# Patient Record
Sex: FEMALE | Race: WHITE | NOT HISPANIC OR LATINO | Employment: FULL TIME | URBAN - METROPOLITAN AREA
[De-identification: names, ages, dates, MRNs, and addresses within clinical notes are randomized per-mention and may not be internally consistent; named-entity substitution may affect disease eponyms.]

---

## 2023-02-23 PROBLEM — R92.8 ABNORMAL MAMMOGRAM: Status: ACTIVE | Noted: 2023-02-23

## 2023-02-23 PROBLEM — R51.9 HEADACHE: Status: ACTIVE | Noted: 2023-02-23

## 2023-02-23 PROBLEM — D72.819 LEUKOPENIA: Status: ACTIVE | Noted: 2023-02-23

## 2023-02-23 PROBLEM — J31.0 CHRONIC RHINITIS: Status: ACTIVE | Noted: 2023-02-23

## 2023-02-23 PROBLEM — M54.50 LOW BACK PAIN: Status: ACTIVE | Noted: 2023-02-23

## 2023-02-23 PROBLEM — E03.9 HYPOTHYROIDISM: Status: ACTIVE | Noted: 2023-02-23

## 2023-02-23 PROBLEM — G43.109 MIGRAINE WITH AURA AND WITHOUT STATUS MIGRAINOSUS, NOT INTRACTABLE: Status: ACTIVE | Noted: 2023-02-23

## 2023-02-23 PROBLEM — R53.83 FATIGUE: Status: ACTIVE | Noted: 2023-02-23

## 2023-02-23 PROBLEM — J34.89 NASAL OBSTRUCTION: Status: ACTIVE | Noted: 2023-02-23

## 2023-02-23 PROBLEM — F34.1 DYSTHYMIC DISORDER: Status: ACTIVE | Noted: 2023-02-23

## 2023-02-23 PROBLEM — D72.810 ACQUIRED LYMPHOPENIA: Status: ACTIVE | Noted: 2023-02-23

## 2023-02-23 PROBLEM — R90.89 ABNORMAL FINDING ON MRI OF BRAIN: Status: ACTIVE | Noted: 2023-02-23

## 2023-02-23 PROBLEM — E55.9 VITAMIN D DEFICIENCY: Status: ACTIVE | Noted: 2023-02-23

## 2023-02-23 PROBLEM — J34.3 HYPERTROPHY OF NASAL TURBINATES: Status: ACTIVE | Noted: 2023-02-23

## 2023-02-23 PROBLEM — T75.3XXA MOTION SICKNESS: Status: ACTIVE | Noted: 2023-02-23

## 2023-02-23 PROBLEM — L23.7 POISON IVY: Status: ACTIVE | Noted: 2023-02-23

## 2023-02-23 PROBLEM — F40.243 FEAR OF FLYING: Status: ACTIVE | Noted: 2023-02-23

## 2023-02-23 PROBLEM — K14.8 TONGUE DEVIATION: Status: ACTIVE | Noted: 2023-02-23

## 2023-02-23 PROBLEM — F41.8 DEPRESSION WITH ANXIETY: Status: ACTIVE | Noted: 2023-02-23

## 2023-02-23 PROBLEM — R09.81 NASAL CONGESTION: Status: ACTIVE | Noted: 2023-02-23

## 2023-02-23 PROBLEM — R09.82 POSTNASAL DRIP: Status: ACTIVE | Noted: 2023-02-23

## 2023-02-23 RX ORDER — MULTIVITAMIN
1 TABLET ORAL DAILY
COMMUNITY
End: 2023-03-23 | Stop reason: ALTCHOICE

## 2023-02-23 RX ORDER — SUMATRIPTAN SUCCINATE 25 MG/1
1 TABLET ORAL
COMMUNITY

## 2023-02-23 RX ORDER — MECLIZINE HYDROCHLORIDE 25 MG/1
25 TABLET ORAL 3 TIMES DAILY PRN
COMMUNITY
End: 2024-01-18 | Stop reason: ALTCHOICE

## 2023-02-23 RX ORDER — DICLOFENAC SODIUM 100 MG/1
100 TABLET, EXTENDED RELEASE ORAL DAILY
COMMUNITY
End: 2024-01-18 | Stop reason: ALTCHOICE

## 2023-02-23 RX ORDER — LEVOTHYROXINE SODIUM 50 UG/1
1 TABLET ORAL DAILY
COMMUNITY
End: 2023-06-22 | Stop reason: SDUPTHER

## 2023-02-23 RX ORDER — NORETHINDRONE ACETATE AND ETHINYL ESTRADIOL, ETHINYL ESTRADIOL AND FERROUS FUMARATE 1MG-10(24)
1 KIT ORAL
COMMUNITY

## 2023-02-23 RX ORDER — SERTRALINE HYDROCHLORIDE 100 MG/1
2 TABLET, FILM COATED ORAL DAILY
COMMUNITY
End: 2023-03-17

## 2023-03-17 DIAGNOSIS — F41.8 OTHER SPECIFIED ANXIETY DISORDERS: ICD-10-CM

## 2023-03-17 RX ORDER — SERTRALINE HYDROCHLORIDE 100 MG/1
TABLET, FILM COATED ORAL
Qty: 90 TABLET | Refills: 1 | Status: SHIPPED | OUTPATIENT
Start: 2023-03-17 | End: 2023-03-23

## 2023-03-23 ENCOUNTER — OFFICE VISIT (OUTPATIENT)
Dept: PRIMARY CARE | Facility: CLINIC | Age: 43
End: 2023-03-23
Payer: COMMERCIAL

## 2023-03-23 VITALS
WEIGHT: 153 LBS | TEMPERATURE: 96.8 F | BODY MASS INDEX: 24.59 KG/M2 | HEIGHT: 66 IN | DIASTOLIC BLOOD PRESSURE: 80 MMHG | SYSTOLIC BLOOD PRESSURE: 122 MMHG

## 2023-03-23 DIAGNOSIS — R53.83 OTHER FATIGUE: Primary | ICD-10-CM

## 2023-03-23 DIAGNOSIS — D72.810 ACQUIRED LYMPHOPENIA: ICD-10-CM

## 2023-03-23 DIAGNOSIS — F41.8 DEPRESSION WITH ANXIETY: ICD-10-CM

## 2023-03-23 PROCEDURE — 99214 OFFICE O/P EST MOD 30 MIN: CPT | Performed by: FAMILY MEDICINE

## 2023-03-23 PROCEDURE — 1036F TOBACCO NON-USER: CPT | Performed by: FAMILY MEDICINE

## 2023-03-23 ASSESSMENT — ENCOUNTER SYMPTOMS
DYSURIA: 0
ABDOMINAL PAIN: 0
DIAPHORESIS: 0
BLOOD IN STOOL: 0
DIARRHEA: 0
DIZZINESS: 0
DEPRESSION: 0
NAUSEA: 0
HEMATURIA: 0
COUGH: 0
FATIGUE: 1
FEVER: 0
VOMITING: 0
LIGHT-HEADEDNESS: 0
CHILLS: 1
SHORTNESS OF BREATH: 0
PALPITATIONS: 1
CONSTIPATION: 0
HEADACHES: 1

## 2023-03-23 NOTE — PATIENT INSTRUCTIONS
I recommend calling for the sleep study and the hematology referral.  I will try a different medication for depression and anxiety.  Stop sertraline.  Instead, we will try trintellix.  Return in one month for a recheck and sooner if anything is worse.

## 2023-03-23 NOTE — PROGRESS NOTES
"Subjective   Patient ID: [id number]     Ewa Chauhan is a 43 y.o. female who presents for Follow-up and Med Refill.  HPI  She is here for a recheck and medication refills.    Her sertraline was increased at her visit last month.  She was referred for a sleep study and for a hematology referral for persistent lymphopenia in the setting of fatigue.      She has a neurologist and has had a  CT and MRI of the brain.      She had covid the last week of December.    She has ongoing anxiety.  This is not better since increasing the sertraline.      She has not yet called for hematology or sleep study appointments.  She has still ongoing fatigue.  She has had fatigue since age 15 and it has worsened over the last five years.      Review of Systems   Constitutional:  Positive for chills and fatigue. Negative for diaphoresis and fever.        Has had chills since her first bout of covid in 2021.   Respiratory:  Negative for cough and shortness of breath.    Cardiovascular:  Positive for palpitations. Negative for chest pain.   Gastrointestinal:  Negative for abdominal pain, blood in stool, constipation, diarrhea, nausea and vomiting.   Genitourinary:  Negative for dysuria and hematuria.   Neurological:  Positive for headaches (migraine medication helps.). Negative for dizziness and light-headedness.         Objective         /80 (BP Location: Right arm, Patient Position: Sitting)   Temp 36 °C (96.8 °F) (Skin)   Ht 1.676 m (5' 6\")   Wt 69.4 kg (153 lb)   BMI 24.69 kg/m²      Physical Exam  Constitutional:       Appearance: Normal appearance.   Cardiovascular:      Rate and Rhythm: Normal rate and regular rhythm.      Heart sounds: Normal heart sounds.   Pulmonary:      Effort: Pulmonary effort is normal.      Breath sounds: Normal breath sounds.   Abdominal:      General: There is no distension.      Palpations: Abdomen is soft. There is no mass.      Tenderness: There is no abdominal tenderness. There is no " guarding or rebound.   Neurological:      Mental Status: She is alert.   Psychiatric:         Behavior: Behavior normal.      Comments: Anxious affect.         Assessment/Plan   Problem List Items Addressed This Visit          Hematologic    Acquired lymphopenia       Other    Depression with anxiety    Relevant Medications    vortioxetine (Trintellix) 5 mg tablet tablet    Fatigue - Primary     I recommend calling for the sleep study and the hematology referral.  I will try a different medication for depression and anxiety.  Stop sertraline.  Instead, we will try trintellix.  Return in one month for a recheck and sooner if anything is worse.    Se Law, DO

## 2023-04-10 DIAGNOSIS — F41.8 DEPRESSION WITH ANXIETY: Primary | ICD-10-CM

## 2023-04-10 RX ORDER — ESCITALOPRAM OXALATE 10 MG/1
10 TABLET ORAL DAILY
Qty: 30 TABLET | Refills: 1 | Status: SHIPPED | OUTPATIENT
Start: 2023-04-10 | End: 2023-06-12

## 2023-04-10 NOTE — PROGRESS NOTES
Insurance won't cover Trintellix until two alternatives are attempted.  She tried sertraline.  I called in escitalopram.

## 2023-04-20 ENCOUNTER — APPOINTMENT (OUTPATIENT)
Dept: PRIMARY CARE | Facility: CLINIC | Age: 43
End: 2023-04-20
Payer: COMMERCIAL

## 2023-05-19 ENCOUNTER — OFFICE VISIT (OUTPATIENT)
Dept: PRIMARY CARE | Facility: CLINIC | Age: 43
End: 2023-05-19
Payer: COMMERCIAL

## 2023-05-19 ENCOUNTER — LAB (OUTPATIENT)
Dept: LAB | Facility: LAB | Age: 43
End: 2023-05-19
Payer: COMMERCIAL

## 2023-05-19 VITALS
WEIGHT: 142 LBS | SYSTOLIC BLOOD PRESSURE: 120 MMHG | DIASTOLIC BLOOD PRESSURE: 80 MMHG | HEART RATE: 67 BPM | OXYGEN SATURATION: 99 % | BODY MASS INDEX: 22.29 KG/M2 | RESPIRATION RATE: 19 BRPM | TEMPERATURE: 97.7 F | HEIGHT: 67 IN

## 2023-05-19 DIAGNOSIS — D72.89: ICD-10-CM

## 2023-05-19 DIAGNOSIS — F41.8 DEPRESSION WITH ANXIETY: ICD-10-CM

## 2023-05-19 DIAGNOSIS — R53.82 CHRONIC FATIGUE: Primary | ICD-10-CM

## 2023-05-19 DIAGNOSIS — R53.82 CHRONIC FATIGUE: ICD-10-CM

## 2023-05-19 PROCEDURE — 82670 ASSAY OF TOTAL ESTRADIOL: CPT

## 2023-05-19 PROCEDURE — 82677 ASSAY OF ESTRIOL: CPT

## 2023-05-19 PROCEDURE — 84144 ASSAY OF PROGESTERONE: CPT

## 2023-05-19 PROCEDURE — 84402 ASSAY OF FREE TESTOSTERONE: CPT

## 2023-05-19 PROCEDURE — 36415 COLL VENOUS BLD VENIPUNCTURE: CPT

## 2023-05-19 PROCEDURE — 99213 OFFICE O/P EST LOW 20 MIN: CPT | Performed by: FAMILY MEDICINE

## 2023-05-19 PROCEDURE — 84403 ASSAY OF TOTAL TESTOSTERONE: CPT

## 2023-05-19 PROCEDURE — 82679 ASSAY OF ESTRONE: CPT

## 2023-05-19 PROCEDURE — 1036F TOBACCO NON-USER: CPT | Performed by: FAMILY MEDICINE

## 2023-05-19 ASSESSMENT — PATIENT HEALTH QUESTIONNAIRE - PHQ9
2. FEELING DOWN, DEPRESSED OR HOPELESS: NOT AT ALL
SUM OF ALL RESPONSES TO PHQ9 QUESTIONS 1 AND 2: 0
1. LITTLE INTEREST OR PLEASURE IN DOING THINGS: NOT AT ALL

## 2023-05-19 ASSESSMENT — PAIN SCALES - GENERAL: PAINLEVEL: 0-NO PAIN

## 2023-05-19 NOTE — PATIENT INSTRUCTIONS
I will prescribe Trintellix.  Return in one month for a recheck.  Hormone levels were rechecked.  You just had a sleep study and the results should be in soon.  If this evaluation fails to find anything and the Trintellix does not help, then a  neurology evaluation may be the next step.

## 2023-05-19 NOTE — PROGRESS NOTES
"Subjective   Patient ID: [id number]     Ewa Chauhan is a 43 y.o. female who presents for Follow-up.  HPI  She is here for a recheck.  She has been followed for unexplained fatigue.  I tried to prescribe Trintellix.  It was not covered.    Instead, she was tried on escitalopram.  It has not helped.  She remains fatigued.  She had a rough week.  Very fatigued. No energy.      She has trouble forming words. Very tired.      No numbness or tingling or weakness of the arms or legs.      Has had headaches for years.  Imitrex helps.    She had a home sleep study last night.  Results are pending.    She had flow cytometry--normal.    Prior to high dose Lexapro, she was on sertraline 100 mg bid.  That was not helpful.  Objective     /80 (BP Location: Right arm, Patient Position: Sitting, BP Cuff Size: Adult)   Pulse 67   Temp 36.5 °C (97.7 °F) (Temporal)   Resp 19   Ht 1.702 m (5' 7\")   Wt 64.4 kg (142 lb)   SpO2 99%   BMI 22.24 kg/m²      Physical Exam  Constitutional:       Appearance: Normal appearance.   Cardiovascular:      Rate and Rhythm: Normal rate and regular rhythm.      Heart sounds: Normal heart sounds.   Pulmonary:      Effort: Pulmonary effort is normal.      Breath sounds: Normal breath sounds.   Neurological:      General: No focal deficit present.      Mental Status: She is alert and oriented to person, place, and time.   Psychiatric:      Comments: Depressed affect.  No SI.           Assessment/Plan   Problem List Items Addressed This Visit          Hematologic    Lymphocyte depletion       Other    Depression with anxiety    Relevant Medications    vortioxetine (Trintellix) 5 mg tablet tablet    Other Relevant Orders    Estradiol    Estrone    Estriol    Progesterone    Testosterone, total and free    Fatigue - Primary    Relevant Orders    Estradiol    Estrone    Estriol    Progesterone    Testosterone, total and free   I will prescribe Trintellix.  Return in one month for a recheck.  " Hormone levels were rechecked.  You just had a sleep study and the results should be in soon.  If this evaluation fails to find anything and the Trintellix does not help, then a  neurology evaluation may be the next step.      Se Law, DO

## 2023-05-20 LAB
ESTRADIOL (PG/ML) IN SER/PLAS: <19 PG/ML
PROGESTERONE (NG/ML) IN SER/PLAS: <0.3 NG/ML

## 2023-05-23 LAB — ESTRIOL: <0.1 NG/ML

## 2023-05-27 LAB
TESTOSTERONE FREE (CHAN): 0.8 PG/ML (ref 0.1–6.4)
TESTOSTERONE,TOTAL,LC-MS/MS: 12 NG/DL (ref 2–45)

## 2023-05-30 ENCOUNTER — TELEPHONE (OUTPATIENT)
Dept: PRIMARY CARE | Facility: CLINIC | Age: 43
End: 2023-05-30
Payer: COMMERCIAL

## 2023-05-30 LAB — ESTRONE: 26.7 PG/ML

## 2023-05-30 NOTE — TELEPHONE ENCOUNTER
----- Message from Se Law DO sent at 5/30/2023  9:40 AM EDT -----  Please let her know her labs were not conclusive but they suggested a menopausal state.  She should try the Trintellix and return in one month.

## 2023-06-11 DIAGNOSIS — F41.8 DEPRESSION WITH ANXIETY: ICD-10-CM

## 2023-06-12 RX ORDER — ESCITALOPRAM OXALATE 10 MG/1
TABLET ORAL
Qty: 30 TABLET | Refills: 1 | Status: SHIPPED | OUTPATIENT
Start: 2023-06-12 | End: 2023-06-22

## 2023-06-22 ENCOUNTER — OFFICE VISIT (OUTPATIENT)
Dept: PRIMARY CARE | Facility: CLINIC | Age: 43
End: 2023-06-22
Payer: COMMERCIAL

## 2023-06-22 VITALS
DIASTOLIC BLOOD PRESSURE: 76 MMHG | SYSTOLIC BLOOD PRESSURE: 122 MMHG | TEMPERATURE: 98.3 F | WEIGHT: 147 LBS | BODY MASS INDEX: 23.07 KG/M2 | HEIGHT: 67 IN

## 2023-06-22 DIAGNOSIS — E03.9 ACQUIRED HYPOTHYROIDISM: ICD-10-CM

## 2023-06-22 DIAGNOSIS — F41.8 DEPRESSION WITH ANXIETY: ICD-10-CM

## 2023-06-22 DIAGNOSIS — R53.82 CHRONIC FATIGUE: ICD-10-CM

## 2023-06-22 DIAGNOSIS — R53.82 CHRONIC FATIGUE: Primary | ICD-10-CM

## 2023-06-22 PROCEDURE — 99213 OFFICE O/P EST LOW 20 MIN: CPT | Performed by: FAMILY MEDICINE

## 2023-06-22 PROCEDURE — 1036F TOBACCO NON-USER: CPT | Performed by: FAMILY MEDICINE

## 2023-06-22 RX ORDER — DESOXIMETASONE 2.5 MG/G
CREAM TOPICAL 2 TIMES DAILY
Qty: 30 G | Refills: 1 | Status: SHIPPED | OUTPATIENT
Start: 2023-06-22 | End: 2023-06-22 | Stop reason: SDUPTHER

## 2023-06-22 RX ORDER — DESOXIMETASONE 2.5 MG/G
CREAM TOPICAL 2 TIMES DAILY
Qty: 30 G | Refills: 1 | Status: SHIPPED | OUTPATIENT
Start: 2023-06-22 | End: 2023-06-22

## 2023-06-22 RX ORDER — LEVOTHYROXINE SODIUM 50 UG/1
50 TABLET ORAL DAILY
Qty: 90 TABLET | Refills: 1 | Status: SHIPPED | OUTPATIENT
Start: 2023-06-22 | End: 2023-10-19 | Stop reason: ALTCHOICE

## 2023-06-22 RX ORDER — DESOXIMETASONE 2.5 MG/G
CREAM TOPICAL 2 TIMES DAILY
Qty: 30 G | Refills: 1 | Status: SHIPPED | OUTPATIENT
Start: 2023-06-22 | End: 2024-01-18 | Stop reason: WASHOUT

## 2023-06-22 NOTE — PATIENT INSTRUCTIONS
I will increase the Trintellix to 10 mg daily.  I will refill your levothyroxine.  I recommend a neurology consult.  You will call a neurologist you have seen before, Dr Selby.  Return in one month for a recheck.

## 2023-06-22 NOTE — PROGRESS NOTES
"Subjective   Patient ID: [id number]     Ewa Chauhan is a 43 y.o. female who presents for 1 month follow up.  HPI  She is here for a one month recheck.  She was prescribed Trintellix.      She is on the trintellix.  Feels better but she is still tired.  She has been on it for three and a half weeks.      Mood is better. The tiredness is still there and \"it muffles everything\".  She would not say she feels 50% better but she does feel some improvement.    She denies any side effects to trintellix.    I looked up her sleep study on the old EMR. Normal home sleep study.    Objective     /76   Temp 36.8 °C (98.3 °F)   Ht 1.702 m (5' 7\")   Wt 66.7 kg (147 lb)   BMI 23.02 kg/m²      Physical Exam  Constitutional:       Appearance: Normal appearance.   Cardiovascular:      Rate and Rhythm: Normal rate and regular rhythm.      Heart sounds: Normal heart sounds.   Pulmonary:      Effort: Pulmonary effort is normal. No respiratory distress.      Breath sounds: Normal breath sounds.   Neurological:      General: No focal deficit present.      Mental Status: She is alert and oriented to person, place, and time.         Assessment/Plan   Problem List Items Addressed This Visit          Endocrine/Metabolic    Hypothyroidism    Relevant Medications    levothyroxine (Synthroid, Levoxyl) 50 mcg tablet       Other    Depression with anxiety    Relevant Medications    vortioxetine (Trintellix) 10 mg tablet tablet    desoximetasone (Topicort) 0.25 % cream    Fatigue - Primary    Relevant Medications    desoximetasone (Topicort) 0.25 % cream     I will increase the Trintellix to 10 mg daily.  I will refill your levothyroxine.  I recommend a neurology consult.  You will call a neurologist you have seen before, Dr Selby.  Return in one month for a recheck.    Se Law, DO   "

## 2023-07-20 ENCOUNTER — OFFICE VISIT (OUTPATIENT)
Dept: PRIMARY CARE | Facility: CLINIC | Age: 43
End: 2023-07-20
Payer: COMMERCIAL

## 2023-07-20 VITALS
BODY MASS INDEX: 24.43 KG/M2 | SYSTOLIC BLOOD PRESSURE: 118 MMHG | TEMPERATURE: 98.6 F | DIASTOLIC BLOOD PRESSURE: 80 MMHG | WEIGHT: 156 LBS

## 2023-07-20 DIAGNOSIS — E03.9 HYPOTHYROIDISM, UNSPECIFIED TYPE: ICD-10-CM

## 2023-07-20 DIAGNOSIS — R53.82 CHRONIC FATIGUE: Primary | ICD-10-CM

## 2023-07-20 DIAGNOSIS — F41.8 DEPRESSION WITH ANXIETY: ICD-10-CM

## 2023-07-20 LAB
EBV INTERPRETATION: ABNORMAL
EPSTEIN-BARR VCA IGG: POSITIVE
EPSTEIN-BARR VCA IGM: NEGATIVE
EPSTEIN-BARR VIRUS EARLY ANTIGEN ANTIBODY, IGG: NEGATIVE
EPSTIEN-BARR NUCLEAR ANTIGEN AB: POSITIVE

## 2023-07-20 PROCEDURE — 86663 EPSTEIN-BARR ANTIBODY: CPT

## 2023-07-20 PROCEDURE — 1036F TOBACCO NON-USER: CPT | Performed by: FAMILY MEDICINE

## 2023-07-20 PROCEDURE — 86665 EPSTEIN-BARR CAPSID VCA: CPT

## 2023-07-20 PROCEDURE — 99213 OFFICE O/P EST LOW 20 MIN: CPT | Performed by: FAMILY MEDICINE

## 2023-07-20 PROCEDURE — 86664 EPSTEIN-BARR NUCLEAR ANTIGEN: CPT

## 2023-07-20 NOTE — PROGRESS NOTES
"Subjective   Patient ID: [id number]     Ewa Chauhan is a 43 y.o. female who presents for Follow-up (1 month).  HPI  She is here for a one month recheck.  She has depression and ongoing fatigue.  I increased her trintellix last month and recommended that she see her neurologist.  She was to see Dr Selby.  She has not scheduled with him.  Gyn referred her to endocrinology.      She states she feels about the same.  She is still tired.  She is going to bed at 8:30.      She had a normal sleep study.      She is being referred to an endocrinologist by her gynecologist.  She is seeing her in a couple of months.      She has depression and anxiety but it is not as bad as the fatigue in her mind. No SI.  \"I just want to take a nap.\"    She had covid twice.  The last time was around Christmas 2022.    Objective     /80 (BP Location: Left arm, Patient Position: Sitting)   Temp 37 °C (98.6 °F)   Wt 70.8 kg (156 lb)   BMI 24.43 kg/m²      Physical Exam  Constitutional:       Appearance: Normal appearance.   Cardiovascular:      Rate and Rhythm: Normal rate and regular rhythm.      Heart sounds: Normal heart sounds.   Pulmonary:      Effort: Pulmonary effort is normal.      Breath sounds: Normal breath sounds.   Neurological:      General: No focal deficit present.      Mental Status: She is alert and oriented to person, place, and time.   Psychiatric:         Mood and Affect: Mood normal.         Assessment/Plan   Problem List Items Addressed This Visit          Endocrine/Metabolic    Hypothyroidism       Mental Health    Depression with anxiety       Symptoms and Signs    Fatigue - Primary    Relevant Orders    Linda-Barr virus VCA antibody panel   Continue the Trintellix.  Follow up with endocrinology.  I will order an Linda Barr Virus panel to test for mono.  I recommend that you call Dr Selby for an appointment.  Return in three months.      Se Law, DO   "

## 2023-07-20 NOTE — PATIENT INSTRUCTIONS
Continue the Trintellix.  Follow up with endocrinology.  I will order an Linda Barr Virus panel to test for mono.  I recommend that you call Dr Selby for an appointment.  Return in three months.

## 2023-07-21 ENCOUNTER — TELEPHONE (OUTPATIENT)
Dept: PRIMARY CARE | Facility: CLINIC | Age: 43
End: 2023-07-21
Payer: COMMERCIAL

## 2023-07-21 NOTE — TELEPHONE ENCOUNTER
----- Message from Se Law DO sent at 7/21/2023 12:24 PM EDT -----  Please let her know her mono test showed that she has had mono at some point in her life.  It showed that this is probably not a recent infection and therefore, probably not a cause of her symptoms.

## 2023-07-21 NOTE — TELEPHONE ENCOUNTER
Nursing Transfer Note      11/10/2019     Transfer From: Observation    Transfer via bed    Transfer with cardiac monitoring    Transported by nurse    Medicines sent: No    Chart send with patient: Yes    Notified: Wife at bedside    Patient reassessed at: 11/10/2019, 1040    Upon arrival to floor: cardiac monitor applied, patient oriented to room, call bell in reach and bed in lowest position   Discussed with patient

## 2023-09-22 DIAGNOSIS — F41.8 DEPRESSION WITH ANXIETY: ICD-10-CM

## 2023-09-22 RX ORDER — VORTIOXETINE 10 MG/1
10 TABLET, FILM COATED ORAL DAILY
Qty: 30 TABLET | Refills: 2 | Status: SHIPPED | OUTPATIENT
Start: 2023-09-22 | End: 2023-10-19

## 2023-10-12 ENCOUNTER — LAB (OUTPATIENT)
Dept: LAB | Facility: LAB | Age: 43
End: 2023-10-12
Payer: COMMERCIAL

## 2023-10-12 DIAGNOSIS — R53.83 OTHER FATIGUE: Primary | ICD-10-CM

## 2023-10-12 LAB
25(OH)D3 SERPL-MCNC: 38 NG/ML (ref 30–100)
ALBUMIN SERPL BCP-MCNC: 4.1 G/DL (ref 3.4–5)
ANION GAP SERPL CALC-SCNC: 12 MMOL/L (ref 10–20)
BUN SERPL-MCNC: 14 MG/DL (ref 6–23)
CALCIUM SERPL-MCNC: 8.8 MG/DL (ref 8.6–10.3)
CHLORIDE SERPL-SCNC: 106 MMOL/L (ref 98–107)
CO2 SERPL-SCNC: 27 MMOL/L (ref 21–32)
CORTIS SERPL-MCNC: 18.5 UG/DL (ref 2.5–20)
CREAT SERPL-MCNC: 0.84 MG/DL (ref 0.5–1.05)
DHEA-S SERPL-MCNC: 112 UG/DL (ref 12–379)
GFR SERPL CREATININE-BSD FRML MDRD: 88 ML/MIN/1.73M*2
GLUCOSE SERPL-MCNC: 93 MG/DL (ref 74–99)
PHOSPHATE SERPL-MCNC: 3.6 MG/DL (ref 2.5–4.9)
POTASSIUM SERPL-SCNC: 4.4 MMOL/L (ref 3.5–5.3)
PTH-INTACT SERPL-MCNC: 28.6 PG/ML (ref 18.5–88)
SODIUM SERPL-SCNC: 141 MMOL/L (ref 136–145)
T3 SERPL-MCNC: 138 NG/DL (ref 60–200)
T4 FREE SERPL-MCNC: 0.94 NG/DL (ref 0.61–1.12)
THYROPEROXIDASE AB SERPL-ACNC: <28 IU/ML
TSH SERPL-ACNC: 1.89 MIU/L (ref 0.44–3.98)

## 2023-10-12 PROCEDURE — 84480 ASSAY TRIIODOTHYRONINE (T3): CPT

## 2023-10-12 PROCEDURE — 86376 MICROSOMAL ANTIBODY EACH: CPT

## 2023-10-12 PROCEDURE — 36415 COLL VENOUS BLD VENIPUNCTURE: CPT

## 2023-10-12 PROCEDURE — 82533 TOTAL CORTISOL: CPT

## 2023-10-12 PROCEDURE — 82627 DEHYDROEPIANDROSTERONE: CPT

## 2023-10-12 PROCEDURE — 80069 RENAL FUNCTION PANEL: CPT

## 2023-10-12 PROCEDURE — 84443 ASSAY THYROID STIM HORMONE: CPT

## 2023-10-12 PROCEDURE — 84439 ASSAY OF FREE THYROXINE: CPT

## 2023-10-12 PROCEDURE — 82024 ASSAY OF ACTH: CPT

## 2023-10-12 PROCEDURE — 83970 ASSAY OF PARATHORMONE: CPT

## 2023-10-12 PROCEDURE — 82306 VITAMIN D 25 HYDROXY: CPT

## 2023-10-14 LAB — ACTH PLAS-MCNC: 14.6 PG/ML (ref 7.2–63.3)

## 2023-10-16 NOTE — RESULT ENCOUNTER NOTE
Ms. Ewa     I looked at your labs and the TSH is where we want it to be, so if you have already started the brand synthroid I sent through the specialty pharmacy then you can continue this same dose, your other adrenal labs also look good and I dont think you need dynamic testing at this time

## 2023-10-19 ENCOUNTER — OFFICE VISIT (OUTPATIENT)
Dept: PRIMARY CARE | Facility: CLINIC | Age: 43
End: 2023-10-19
Payer: COMMERCIAL

## 2023-10-19 VITALS
TEMPERATURE: 97.3 F | BODY MASS INDEX: 24.9 KG/M2 | SYSTOLIC BLOOD PRESSURE: 112 MMHG | WEIGHT: 159 LBS | DIASTOLIC BLOOD PRESSURE: 74 MMHG

## 2023-10-19 DIAGNOSIS — F41.8 DEPRESSION WITH ANXIETY: Primary | ICD-10-CM

## 2023-10-19 PROCEDURE — 99213 OFFICE O/P EST LOW 20 MIN: CPT | Performed by: FAMILY MEDICINE

## 2023-10-19 PROCEDURE — 1036F TOBACCO NON-USER: CPT | Performed by: FAMILY MEDICINE

## 2023-10-19 RX ORDER — FLUOXETINE 20 MG/1
20 TABLET ORAL DAILY
Qty: 30 TABLET | Refills: 2 | Status: SHIPPED | OUTPATIENT
Start: 2023-10-19 | End: 2024-01-15

## 2023-10-19 RX ORDER — LEVOTHYROXINE SODIUM 75 UG/1
75 TABLET ORAL
COMMUNITY
End: 2024-03-28 | Stop reason: SDUPTHER

## 2023-10-19 NOTE — PROGRESS NOTES
Subjective   Patient ID: [id number]     Ewa Chauhan is a 43 y.o. female who presents for Follow-up and Med Refill (Trintellix refills needed.).  HPI  She is here for a recheck on depression and fatigue and refills of Trintellix.      She is feeling about the same.      She saw an endocrinologist. She changed the levothyroxine to brand name Synthroid.    She still feels a lot of fatigue.      Hem/Onc report reviewed. Everything looked fine.  Recommended recheck in six months.    She has not seen neurology yet.  She was wanting to see endo first.      She will call for an appointment with Dr Selby, neurology, in [place].      She has tried counseling and states it is not for her.  She is more tired than depressed.      She has not noticed much benefit to protonix.              Objective     /74 (BP Location: Left arm, Patient Position: Sitting)   Temp 36.3 °C (97.3 °F) (Skin)   Wt 72.1 kg (159 lb)   BMI 24.90 kg/m²      Physical Exam  Pulmonary:      Effort: Pulmonary effort is normal.      Breath sounds: Normal breath sounds.   Abdominal:      Palpations: Abdomen is soft.   Neurological:      General: No focal deficit present.      Mental Status: She is alert and oriented to person, place, and time.   Psychiatric:      Comments: Depressed affect.  Tired.  No suicidal thoughts.         Assessment/Plan   Problem List Items Addressed This Visit       Depression with anxiety - Primary    Relevant Medications    FLUoxetine (PROzac) 20 mg tablet   You report trintellix does not seem to help much and it is expensive. I will switch you to Prozac.  You have already tried escitalopram and sertraline.  Return in three months for a recheck.  I recommend that you see a neurologist.     Se Law, DO

## 2023-10-19 NOTE — PATIENT INSTRUCTIONS
You report trintellix does not seem to help much and it is expensive. I will switch you to Prozac.  You have already tried escitalopram and sertraline.  Return in three months for a recheck.  I recommend that you see a neurologist.

## 2023-10-26 ENCOUNTER — TELEMEDICINE (OUTPATIENT)
Dept: BEHAVIORAL HEALTH | Facility: CLINIC | Age: 43
End: 2023-10-26
Payer: COMMERCIAL

## 2023-10-26 DIAGNOSIS — F34.1 DYSTHYMIC DISORDER: ICD-10-CM

## 2023-10-26 DIAGNOSIS — F41.8 DEPRESSION WITH ANXIETY: ICD-10-CM

## 2023-10-26 PROCEDURE — 90837 PSYTX W PT 60 MINUTES: CPT | Performed by: PSYCHOLOGIST

## 2023-10-26 NOTE — PROGRESS NOTES
1:00pm to 1:55pm  Met with client today for her individual session via telehealth.  Client was given an ADHD specific assessment interview.  Upon completion of the assessment client's interview was scored and client does NOT have ADHD.  Client will be given her results during the next session along with a psycho education around her anxiety which probably the culprit

## 2023-11-16 ENCOUNTER — TELEMEDICINE (OUTPATIENT)
Dept: BEHAVIORAL HEALTH | Facility: CLINIC | Age: 43
End: 2023-11-16
Payer: COMMERCIAL

## 2023-11-16 DIAGNOSIS — F34.1 DYSTHYMIC DISORDER: ICD-10-CM

## 2023-11-16 DIAGNOSIS — F41.8 DEPRESSION WITH ANXIETY: ICD-10-CM

## 2023-11-16 PROCEDURE — 90832 PSYTX W PT 30 MINUTES: CPT | Performed by: PSYCHOLOGIST

## 2023-11-16 NOTE — PROGRESS NOTES
2:00pm to 2:22pm  Met with client today for her individual session via [email or web address].  Client was given the results of her ADHD testing.  Client does NOT have ADHD. Recommended that client participate in solution-based counseling as well as med management in order to manage her mood symptoms.  Client was given three referrals via email.    Client's case is currently being closed as her assessment has been completed.

## 2024-01-13 DIAGNOSIS — F41.8 DEPRESSION WITH ANXIETY: ICD-10-CM

## 2024-01-15 RX ORDER — FLUOXETINE 20 MG/1
20 TABLET ORAL DAILY
Qty: 30 TABLET | Refills: 2 | Status: SHIPPED | OUTPATIENT
Start: 2024-01-15 | End: 2024-01-18 | Stop reason: SDUPTHER

## 2024-01-18 ENCOUNTER — OFFICE VISIT (OUTPATIENT)
Dept: PRIMARY CARE | Facility: CLINIC | Age: 44
End: 2024-01-18
Payer: COMMERCIAL

## 2024-01-18 VITALS
WEIGHT: 156 LBS | TEMPERATURE: 97.9 F | DIASTOLIC BLOOD PRESSURE: 68 MMHG | SYSTOLIC BLOOD PRESSURE: 110 MMHG | BODY MASS INDEX: 24.43 KG/M2

## 2024-01-18 DIAGNOSIS — M54.50 CHRONIC LOW BACK PAIN WITHOUT SCIATICA, UNSPECIFIED BACK PAIN LATERALITY: ICD-10-CM

## 2024-01-18 DIAGNOSIS — E03.9 HYPOTHYROIDISM, UNSPECIFIED TYPE: ICD-10-CM

## 2024-01-18 DIAGNOSIS — G89.29 CHRONIC LOW BACK PAIN WITHOUT SCIATICA, UNSPECIFIED BACK PAIN LATERALITY: ICD-10-CM

## 2024-01-18 DIAGNOSIS — F41.8 DEPRESSION WITH ANXIETY: Primary | ICD-10-CM

## 2024-01-18 DIAGNOSIS — R53.82 CHRONIC FATIGUE: ICD-10-CM

## 2024-01-18 PROCEDURE — 1036F TOBACCO NON-USER: CPT | Performed by: FAMILY MEDICINE

## 2024-01-18 PROCEDURE — 99214 OFFICE O/P EST MOD 30 MIN: CPT | Performed by: FAMILY MEDICINE

## 2024-01-18 RX ORDER — FLUOXETINE 20 MG/1
20 TABLET ORAL 2 TIMES DAILY
Qty: 60 TABLET | Refills: 2 | Status: SHIPPED | OUTPATIENT
Start: 2024-01-18 | End: 2024-04-15

## 2024-01-18 RX ORDER — FLUOXETINE 20 MG/1
20 TABLET ORAL 2 TIMES DAILY
Qty: 60 TABLET | Refills: 2
Start: 2024-01-18 | End: 2024-01-18 | Stop reason: SDUPTHER

## 2024-01-18 RX ORDER — CELECOXIB 200 MG/1
200 CAPSULE ORAL DAILY
Qty: 30 CAPSULE | Refills: 2 | Status: SHIPPED | OUTPATIENT
Start: 2024-01-18 | End: 2024-04-15

## 2024-01-18 NOTE — PATIENT INSTRUCTIONS
I will increase your fluoxetine to 40 mg daily.  Return in three months for a recheck and sooner if there are any problems.  I will prescribe celebrex for back pain in the lumbar spine.

## 2024-01-18 NOTE — PROGRESS NOTES
Subjective   Patient ID: [id number]     Ewa Chauhan is a 43 y.o. female who presents for Follow-up and Med Refill (Discuss increasing Prozac.).  HPI  She is here for a recheck.  She has depression, hypothyroidism and chronic fatigue.  She remains on fluoxetine, Loestrin Fe, levothyroxine and sumatriptan.      In October, we changed her from Trintelex to Prozac because Trintelex was not helping and was very expensive.      She feels like her appetite is less.  She is still very fatigued.  Had a sleep study.  She has a depressed mood but the main problem is tiredness.  If she did not have an appointment she would still be in bed.      She works as a dental assistant.   No missing work.      No side effects to the new medication.      She saw endocrinology and levothyroxine was increased to 75 mcg.  She sees her again in March.    Objective     /68 (BP Location: Right arm, Patient Position: Sitting)   Temp 36.6 °C (97.9 °F)   Wt 70.8 kg (156 lb)   BMI 24.43 kg/m²      Physical Exam  Constitutional:       Appearance: Normal appearance.   Cardiovascular:      Rate and Rhythm: Normal rate and regular rhythm.   Pulmonary:      Effort: Pulmonary effort is normal.      Breath sounds: Normal breath sounds.   Abdominal:      General: Abdomen is flat.      Palpations: Abdomen is soft.      Tenderness: There is no abdominal tenderness.   Neurological:      Mental Status: She is alert.         Assessment/Plan   Problem List Items Addressed This Visit       Low back pain    Relevant Medications    celecoxib (CeleBREX) 200 mg capsule    Depression with anxiety - Primary    Relevant Medications    FLUoxetine (PROzac) 20 mg tablet    Fatigue    Hypothyroidism   I will increase your fluoxetine to 40 mg daily.  Return in three months for a recheck and sooner if there are any problems.  I will prescribe celebrex for back pain in the lumbar spine.      Se Law, DO

## 2024-03-05 ENCOUNTER — OFFICE VISIT (OUTPATIENT)
Dept: NEUROLOGY | Facility: CLINIC | Age: 44
End: 2024-03-05
Payer: COMMERCIAL

## 2024-03-05 VITALS
HEART RATE: 81 BPM | RESPIRATION RATE: 16 BRPM | SYSTOLIC BLOOD PRESSURE: 116 MMHG | DIASTOLIC BLOOD PRESSURE: 72 MMHG | WEIGHT: 155.8 LBS | BODY MASS INDEX: 24.45 KG/M2 | TEMPERATURE: 96.9 F | HEIGHT: 67 IN

## 2024-03-05 DIAGNOSIS — G43.109 MIGRAINE WITH AURA AND WITHOUT STATUS MIGRAINOSUS, NOT INTRACTABLE: ICD-10-CM

## 2024-03-05 DIAGNOSIS — R53.83 OTHER FATIGUE: ICD-10-CM

## 2024-03-05 DIAGNOSIS — G47.19 EXCESSIVE DAYTIME SLEEPINESS: Primary | ICD-10-CM

## 2024-03-05 DIAGNOSIS — R51.9 NONINTRACTABLE HEADACHE, UNSPECIFIED CHRONICITY PATTERN, UNSPECIFIED HEADACHE TYPE: ICD-10-CM

## 2024-03-05 PROCEDURE — 1036F TOBACCO NON-USER: CPT | Performed by: PSYCHIATRY & NEUROLOGY

## 2024-03-05 PROCEDURE — 99214 OFFICE O/P EST MOD 30 MIN: CPT | Performed by: PSYCHIATRY & NEUROLOGY

## 2024-03-05 ASSESSMENT — ENCOUNTER SYMPTOMS
LOSS OF SENSATION IN FEET: 0
OCCASIONAL FEELINGS OF UNSTEADINESS: 0
DEPRESSION: 0

## 2024-03-05 NOTE — PATIENT INSTRUCTIONS
The patient is complaining of excessive daytime sleepiness and fatigue.  She had a normal home sleep study done recently.  I am concerned about the possibility of narcolepsy or idiopathic hypersomnia.  The patient will need an in lab diagnostic polysomnogram followed by an MSLT the next day.  The patient needs HLA testing.  The patient will need to fill out a 2-week sleep log leading up to the sleep testing.  The patient will need a urine drug screen the day of the MSLT.  The patient needs to improve her sleep hygiene, get least 8 hours sleep at night and avoid the supine position.  The patient should not drive while drowsy.  The patient can use symptomatic pain medicines for severe headaches.  I discussed all these issues in detail with the patient and answered all of her questions.  The patient will follow-up with me in 4 months.

## 2024-03-05 NOTE — PROGRESS NOTES
Subjective     Ewa Chauhan 44 y.o.  HPI  The patient states that she feels fatigued during the day where she complains of a low energy level and also feels that she is sleepy where she can fall asleep and take a nap.  The patient states that she has lights out at 9 PM and it takes her about 30 minutes to fall asleep.  The patient will wake for the day at 6 AM.  She feels that her sleep is refreshing and after about an hour she begins to feel sleepy again.   she naps on her days off from work about 90% of the time for approximately 3 to 4 hours.  She does not fall asleep while reading, driving or watching TV.  The patient recently had a home sleep study that showed an AHI of 3.0 with a low oxygen saturation of 92%.  The patient states that when she is falling asleep or waking up she does not feel that she hallucinates.  She denies any sleep paralysis.  The patient denies any symptoms of cataplexy.    The patient states that her headaches are fairly well-controlled and she has about 3 severe headaches a year.  The patient is taking Midol and feels that this works well for her.    Review of Systems   All other systems reviewed and are negative.       Patient Active Problem List   Diagnosis    Abnormal finding on MRI of brain    Abnormal mammogram    Low back pain    Chronic rhinitis    Dysthymic disorder    Depression with anxiety    Fatigue    Fear of flying    Headache    Hypothyroidism    Leukopenia    Migraine with aura and without status migrainosus, not intractable    Nasal congestion    Nasal obstruction    Poison ivy    Postnasal drip    Tongue deviation    Vitamin D deficiency    Hypertrophy of nasal turbinates    Acquired lymphopenia    Motion sickness    Lymphocyte depletion        Past Medical History:   Diagnosis Date    Allergic 4/22    Anxiety     Depression     Disease of thyroid gland     Encounter for gynecological examination (general) (routine) without abnormal findings 08/02/2019    Well woman  exam    Headache, unspecified 03/28/2019    Headache    Hypertrophy of nasal turbinates     Nasal turbinate hypertrophy    Personal history of other mental and behavioral disorders     History of depression        Past Surgical History:   Procedure Laterality Date    MANDIBLE SURGERY  10/29/2013    Jaw Surgery    OTHER SURGICAL HISTORY  07/17/2020    Rhinoplasty    WISDOM TOOTH EXTRACTION          Social History     Socioeconomic History    Marital status:      Spouse name: Not on file    Number of children: Not on file    Years of education: Not on file    Highest education level: Not on file   Occupational History    Not on file   Tobacco Use    Smoking status: Never     Passive exposure: Never    Smokeless tobacco: Never   Vaping Use    Vaping Use: Never used   Substance and Sexual Activity    Alcohol use: Yes     Alcohol/week: 1.0 standard drink of alcohol     Types: 1 Standard drinks or equivalent per week     Comment: 1or less a week    Drug use: Never    Sexual activity: Yes     Partners: Male   Other Topics Concern    Not on file   Social History Narrative    Not on file     Social Determinants of Health     Financial Resource Strain: Not on file   Food Insecurity: Not on file   Transportation Needs: Not on file   Physical Activity: Not on file   Stress: Not on file   Social Connections: Not on file   Intimate Partner Violence: Not on file   Housing Stability: Not on file        Family History   Problem Relation Name Age of Onset    Hypothyroidism Mother Mom     Arthritis Mother Mom     Other (hemmorhagic stroke) Father Dad     Stroke Father Dad     Other (Acute myocardial infarction) Maternal Grandmother      Other (CVA) Maternal Grandmother          Current Outpatient Medications   Medication Instructions    celecoxib (CELEBREX) 200 mg, oral, Daily    FLUoxetine (PROZAC) 20 mg, oral, 2 times daily    levothyroxine (SYNTHROID) 75 mcg, oral, Daily before breakfast    norethindrone-e.estradioL-iron (Lo  "Loestrin Fe) 1 mg-10 mcg (24)/10 mcg (2) tablet 1 tablet, oral, Every Day, As directed.    SUMAtriptan (Imitrex) 25 mg tablet 1 tablet, oral, Take at headache onset and may repeat times 1        No Known Allergies     Objective  /72 (BP Location: Right arm)   Pulse 81   Temp 36.1 °C (96.9 °F)   Resp 16   Ht 1.702 m (5' 7\")   Wt 70.7 kg (155 lb 12.8 oz)   BMI 24.40 kg/m²    GENERAL APPEARANCE:  No distress, alert and cooperative.     MENTAL STATE:  Orientation was normal to time, place and person. Recent and remote memory was intact.  Attention span and concentration were normal. Language testing was normal for comprehension, repetition, expression, and naming. Calculation was intact. The patient could correctly interpret a picture, and copy a diagram. General fund of knowledge was intact. Mini-mental status examination was performed with no errors.     CRANIAL NERVES:  Cranial nerves were normal.      CN 2- Visual Acuity  OD: 20/20 (corrected)   OS: 20/20 (corrected); visual fields full to confrontation.      CN 3, 4, 6-  Pupils round, 4 mm in diameter, equally reactive to light. No ptosis. EOMs normal alignment, full range of movement, no nystagmus     CN 5- Facial sensation intact bilaterally. Normal corneal reflexes.      CN 7- Normal and symmetric facial strength. Nasolabial folds symmetric.     CN 8- Hearing intact to finger rub, whisper.      CN 9- Palate elevates symmetrically. Normal gag reflex.      CN 11- Normal strength of shoulder shrug and neck turning      CN 12- Tongue midline, with normal bulk and strength; no fasciculations.     MOTOR:  Motor exam was normal. Muscle bulk and tone were normal in both upper and lower extremities. Muscle strength was 5/5 in distal and proximal muscles in both upper and lower extremities. No fasciculations, tremor or other abnormal movements were present.     GAIT: Station was stable with a normal base and negative Romberg sign. Gait was stable with a normal " arm swing and speed. No ataxia, shuffling, steppage or waddling was noted. Tandem gait was intact. Postural reflexes were normal.     Assessment/Plan   The patient is complaining of excessive daytime sleepiness and fatigue.  She had a normal home sleep study done recently.  I am concerned about the possibility of narcolepsy or idiopathic hypersomnia.  The patient will need an in lab diagnostic polysomnogram followed by an MSLT the next day.  The patient needs HLA testing.  The patient will need to fill out a 2-week sleep log leading up to the sleep testing.  The patient will need a urine drug screen the day of the MSLT.  The patient needs to improve her sleep hygiene, get least 8 hours sleep at night and avoid the supine position.  The patient should not drive while drowsy.  The patient can use symptomatic pain medicines for severe headaches.  I discussed all these issues in detail with the patient and answered all of her questions.  The patient will follow-up with me in 4 months.

## 2024-03-28 ENCOUNTER — OFFICE VISIT (OUTPATIENT)
Dept: ENDOCRINOLOGY | Facility: CLINIC | Age: 44
End: 2024-03-28
Payer: COMMERCIAL

## 2024-03-28 VITALS
WEIGHT: 155.4 LBS | DIASTOLIC BLOOD PRESSURE: 64 MMHG | HEART RATE: 73 BPM | SYSTOLIC BLOOD PRESSURE: 109 MMHG | BODY MASS INDEX: 24.34 KG/M2 | TEMPERATURE: 98 F

## 2024-03-28 DIAGNOSIS — E03.9 HYPOTHYROIDISM, UNSPECIFIED TYPE: Primary | ICD-10-CM

## 2024-03-28 PROCEDURE — 99213 OFFICE O/P EST LOW 20 MIN: CPT | Performed by: STUDENT IN AN ORGANIZED HEALTH CARE EDUCATION/TRAINING PROGRAM

## 2024-03-28 PROCEDURE — 1036F TOBACCO NON-USER: CPT | Performed by: STUDENT IN AN ORGANIZED HEALTH CARE EDUCATION/TRAINING PROGRAM

## 2024-03-28 RX ORDER — LEVOTHYROXINE SODIUM 75 UG/1
75 TABLET ORAL
Qty: 90 TABLET | Refills: 2 | Status: SHIPPED | OUTPATIENT
Start: 2024-03-28

## 2024-03-28 RX ORDER — LIOTHYRONINE SODIUM 5 UG/1
5 TABLET ORAL DAILY
Qty: 30 TABLET | Refills: 11 | Status: SHIPPED | OUTPATIENT
Start: 2024-03-28 | End: 2025-03-28

## 2024-03-28 NOTE — PROGRESS NOTES
43 F PMH: migraine, hypothyroidism      Following up for thryoid    Initially referred by [facility] for evaluation of thyroid and symptoms of fatigue. Diagnosed sometime in 2022. Since starting replacement, has continued to have symtpoms mainly fatigue and brain fog. symptoms described as unable to normal routine activities due to tiredness          previous meds;  levothryoxine  armour  then again synthroid 50mg, on this for about 10 months     Current regimen:  Sythroid 75mcg     Last TSH was 1.89   Had a home sleep study that was normal   Being worked up for narcolepsy, and will have an in lab study done in May 2024       GYN- on OCP due to irregular periods     Other endocrine causes were negative  Cortisol not low  No DM or vitamin D deficiency       PMH: as above  Famhx: mother hypothyroid, no other autoimmune problems in the family   Social: works as a dental assistant     ROS reviewed and is negative except for pertinent findings noted on HPI    Physical Exam  Constitutional:       Appearance: Normal appearance.   Neck:      Comments: No goiter, no nodule palpated  Cardiovascular:      Rate and Rhythm: Normal rate and regular rhythm.   Musculoskeletal:         General: No swelling.      Cervical back: Neck supple.   Skin:     General: Skin is warm.   Neurological:      General: No focal deficit present.      Mental Status: She is alert and oriented to person, place, and time.         labs and imaging reviewed, pertinent findings listed on HPI and Impression    Problem List Items Addressed This Visit       Hypothyroidism - Primary    Relevant Medications    levothyroxine (Synthroid) 75 mcg tablet    liothyronine (Cytomel) 5 mcg tablet    Other Relevant Orders    Thyroid Stimulating Hormone    Thyroxine, Free    Triiodothyronine, Total     Hypothyroidism      Most Recent TSH is at goal around the 1s range with persistent symptoms of daytime fatigue and somnolence, this is unlikely due to her hypothyroidism as she  is also clinically euthyroid on exam.  We can add T3 to help with symptoms but this will unlikely completely resolve the issue     Synthroid 75mcg.  Interested in possibly switching brands in the future to tirosint   Add T3 5mcg daily   Labs end of May     Follow up in 6 months

## 2024-03-28 NOTE — PATIENT INSTRUCTIONS
Synthroid 75mcg daily   T3 5mcg daily     Dorys Archer MD  Divison of Endocrinology   [facility]   Phone: [phone]    option 4, then option 1  Fax: [phone]

## 2024-04-12 DIAGNOSIS — F41.8 DEPRESSION WITH ANXIETY: ICD-10-CM

## 2024-04-15 DIAGNOSIS — M54.50 CHRONIC LOW BACK PAIN WITHOUT SCIATICA, UNSPECIFIED BACK PAIN LATERALITY: ICD-10-CM

## 2024-04-15 DIAGNOSIS — G89.29 CHRONIC LOW BACK PAIN WITHOUT SCIATICA, UNSPECIFIED BACK PAIN LATERALITY: ICD-10-CM

## 2024-04-15 RX ORDER — CELECOXIB 200 MG/1
200 CAPSULE ORAL DAILY
Qty: 90 CAPSULE | Refills: 0 | Status: SHIPPED | OUTPATIENT
Start: 2024-04-15 | End: 2024-10-12

## 2024-04-15 RX ORDER — FLUOXETINE 20 MG/1
20 TABLET ORAL 2 TIMES DAILY
Qty: 60 TABLET | Refills: 2 | Status: SHIPPED | OUTPATIENT
Start: 2024-04-15

## 2024-04-19 ENCOUNTER — LAB (OUTPATIENT)
Dept: LAB | Facility: LAB | Age: 44
End: 2024-04-19
Payer: COMMERCIAL

## 2024-04-19 ENCOUNTER — OFFICE VISIT (OUTPATIENT)
Dept: PRIMARY CARE | Facility: CLINIC | Age: 44
End: 2024-04-19
Payer: COMMERCIAL

## 2024-04-19 VITALS
BODY MASS INDEX: 23.81 KG/M2 | WEIGHT: 152 LBS | SYSTOLIC BLOOD PRESSURE: 108 MMHG | DIASTOLIC BLOOD PRESSURE: 62 MMHG | TEMPERATURE: 97.3 F

## 2024-04-19 DIAGNOSIS — E03.9 ACQUIRED HYPOTHYROIDISM: ICD-10-CM

## 2024-04-19 DIAGNOSIS — R53.82 CHRONIC FATIGUE: Primary | ICD-10-CM

## 2024-04-19 DIAGNOSIS — G47.19 EXCESSIVE DAYTIME SLEEPINESS: ICD-10-CM

## 2024-04-19 PROCEDURE — 81383 HLA II TYPING 1 ALLELE HR: CPT

## 2024-04-19 PROCEDURE — 1036F TOBACCO NON-USER: CPT | Performed by: FAMILY MEDICINE

## 2024-04-19 PROCEDURE — 99214 OFFICE O/P EST MOD 30 MIN: CPT | Performed by: FAMILY MEDICINE

## 2024-04-19 PROCEDURE — 36415 COLL VENOUS BLD VENIPUNCTURE: CPT

## 2024-04-19 NOTE — PATIENT INSTRUCTIONS
Continue the present treatment.  You have been on 5000 U daily of vitamin d.  This is fine but we should recheck a level in about a month.  Follow up with the thyroid labs and the sleep study to evaluate for narcolepsy.  Return after seeing neurology in July.  Call if refills are needed.

## 2024-04-19 NOTE — PROGRESS NOTES
Subjective   Patient ID: [id number]     Ewa Chauhan is a 44 y.o. female who presents for 3 month follow up.  HPI  She is here for a recheck on fatigue.      She saw endocrinology and neurology since her last visit here.  Both reports reviewed today.      T3 was added to her thyroid med tx, although endo did not think the fatigue was likely to be thyroid related.  She states that so far the thyroid medication has not helped.    Neuro is evaluating her for narcolepsy.    She is schedule for a specialized sleep study in May.      Neurology ordered HLA testing and endo ordered repeat thyroid testing in late May.      She started taking B12 and vitamin d.  She just started it this week.      Objective     /62 (BP Location: Right arm, Patient Position: Sitting)   Temp 36.3 °C (97.3 °F) (Temporal)   Wt 68.9 kg (152 lb)   BMI 23.81 kg/m²      Physical Exam  Constitutional:       Appearance: Normal appearance.   Cardiovascular:      Rate and Rhythm: Normal rate and regular rhythm.      Heart sounds: Normal heart sounds. No murmur heard.  Pulmonary:      Effort: Pulmonary effort is normal. No respiratory distress.      Breath sounds: Normal breath sounds.   Neurological:      General: No focal deficit present.      Mental Status: She is alert and oriented to person, place, and time.         Assessment/Plan   Problem List Items Addressed This Visit       Fatigue - Primary    Relevant Orders    Vitamin D 25-Hydroxy,Total (for eval of Vitamin D levels)    Hypothyroidism     Continue the present treatment.  You have been on 5000 U daily of vitamin d.  This is fine but we should recheck a level in about a month.  Follow up with the thyroid labs and the sleep study to evaluate for narcolepsy.  Return after seeing neurology in July.  Call if refills are needed.    Se Law,

## 2024-04-23 LAB
HLA RESULTS: NORMAL
HLA TYPING NARCOLEPSY: NEGATIVE

## 2024-05-13 ENCOUNTER — CLINICAL SUPPORT (OUTPATIENT)
Dept: SLEEP MEDICINE | Facility: CLINIC | Age: 44
End: 2024-05-13
Payer: COMMERCIAL

## 2024-05-13 ENCOUNTER — LAB (OUTPATIENT)
Dept: LAB | Facility: LAB | Age: 44
End: 2024-05-13
Payer: COMMERCIAL

## 2024-05-13 DIAGNOSIS — G47.33 OBSTRUCTIVE SLEEP APNEA (ADULT) (PEDIATRIC): ICD-10-CM

## 2024-05-13 DIAGNOSIS — G47.19 EXCESSIVE DAYTIME SLEEPINESS: ICD-10-CM

## 2024-05-13 DIAGNOSIS — G47.9 SLEEP DISORDER, UNSPECIFIED: ICD-10-CM

## 2024-05-13 LAB
AMPHETAMINES UR QL SCN: NORMAL
BARBITURATES UR QL SCN: NORMAL
BENZODIAZ UR QL SCN: NORMAL
BZE UR QL SCN: NORMAL
CANNABINOIDS UR QL SCN: NORMAL
FENTANYL+NORFENTANYL UR QL SCN: NORMAL
METHADONE UR QL SCN: NORMAL
OPIATES UR QL SCN: NORMAL
OXYCODONE+OXYMORPHONE UR QL SCN: NORMAL
PCP UR QL SCN: NORMAL

## 2024-05-13 PROCEDURE — 80307 DRUG TEST PRSMV CHEM ANLYZR: CPT

## 2024-05-13 PROCEDURE — 95810 POLYSOM 6/> YRS 4/> PARAM: CPT | Performed by: PSYCHIATRY & NEUROLOGY

## 2024-05-13 ASSESSMENT — SLEEP AND FATIGUE QUESTIONNAIRES
HOW LIKELY ARE YOU TO NOD OFF OR FALL ASLEEP WHILE SITTING AND TALKING TO SOMEONE: WOULD NEVER DOZE
HOW LIKELY ARE YOU TO NOD OFF OR FALL ASLEEP WHILE LYING DOWN TO REST IN THE AFTERNOON WHEN CIRCUMSTANCES PERMIT: HIGH CHANCE OF DOZING
HOW LIKELY ARE YOU TO NOD OFF OR FALL ASLEEP IN A CAR, WHILE STOPPED FOR A FEW MINUTES IN TRAFFIC: WOULD NEVER DOZE
SITING INACTIVE IN A PUBLIC PLACE LIKE A CLASS ROOM OR A MOVIE THEATER: SLIGHT CHANCE OF DOZING
ESS-CHAD TOTAL SCORE: 10
HOW LIKELY ARE YOU TO NOD OFF OR FALL ASLEEP WHILE SITTING AND READING: MODERATE CHANCE OF DOZING
HOW LIKELY ARE YOU TO NOD OFF OR FALL ASLEEP WHILE WATCHING TV: MODERATE CHANCE OF DOZING
HOW LIKELY ARE YOU TO NOD OFF OR FALL ASLEEP WHILE SITTING QUIETLY AFTER LUNCH WITHOUT ALCOHOL: SLIGHT CHANCE OF DOZING
HOW LIKELY ARE YOU TO NOD OFF OR FALL ASLEEP WHEN YOU ARE A PASSENGER IN A CAR FOR AN HOUR WITHOUT A BREAK: SLIGHT CHANCE OF DOZING

## 2024-05-14 ENCOUNTER — PROCEDURE VISIT (OUTPATIENT)
Dept: SLEEP MEDICINE | Facility: CLINIC | Age: 44
End: 2024-05-14
Payer: COMMERCIAL

## 2024-05-14 DIAGNOSIS — G47.19 EXCESSIVE DAYTIME SLEEPINESS: ICD-10-CM

## 2024-05-14 NOTE — PROGRESS NOTES
[facility] TECH NOTE:     Patient: Ewa Chauhan   MRN//AGE: [id number]  1980  44 y.o.   Technologist: Lenny Corbin   Room: 4   Service Date: 2024        Sleep Testing Location: [facility]:     TECHNOLOGIST SLEEP STUDY PROCEDURE NOTE:   This sleep study is being conducted according to the policies and procedures outlined by the AAS accreditation standards.  The sleep study procedure and processes involved during this appointment was explained to the patient/patient’s family, questions were answered. The patient/family verbalized understanding.      The patient is a 44 y.o. year old female scheduled for aMSLT/MWT with montage of:  MSLT . she arrived for her appointment.      The study that was ultimately completed was aMSLT/MWT with montage of:  MSLT .    The full study Was completed.  Patient questionnaires completed?: yes     Consents signed? yes    Initial Fall Risk Screening:     Ewa has not fallen in the last 6 months. her did not result in injury. Ewa does not have a fear of falling. He does not need assistance with sitting, standing, or walking. she does not need assistance walking in her home. she does not need assistance in an unfamiliar setting. The patient is notusing an assistive device.     Brief Study observations: 44-year-old female presented for an MSLT study.  A full study was completed     Deviation to order/protocol and reason: none      If PAP, which was preferred mask/pressure/mode: n/a      Other:None    After the procedure, the patient/family was informed to ensure followup with ordering clinician for testing results.      Technologist: Lenny oCrbin

## 2024-05-14 NOTE — PROGRESS NOTES
[facility] TECH NOTE:     Patient: Ewa Chauhan   MRN//AGE: [id number]  1980  44 y.o.   Technologist: Toyin Reddy   Room: 4   Service Date: 2024        Sleep Testing Location: [facility]:     TECHNOLOGIST SLEEP STUDY PROCEDURE NOTE:   This sleep study is being conducted according to the policies and procedures outlined by the AAS accreditation standards.  The sleep study procedure and processes involved during this appointment was explained to the patient/patient’s family, questions were answered. The patient/family verbalized understanding.      The patient is a 44 y.o. year old female scheduled for a diagnostic PSG  with montage of:  SPLIT PSG . she arrived for her appointment.      The study that was ultimately completed was a with montage of:  SPLIT PSG .    The full study Was completed.  Patient questionnaires completed?: yes     Consents signed? yes    Initial Fall Risk Screening:     Ewa has not fallen in the last 6 months. her did not result in injury. Ewa does not have a fear of falling. He does not need assistance with sitting, standing, or walking. she does not need assistance walking in her home. she does not need assistance in an unfamiliar setting. The patient is notusing an assistive device.     Brief Study observations: The patient is a 44 year old female here for a diagnostic PSG with an MSLT to follow. Ewa arrived at 1945 and completed paperwork. The patient had a previous HST that showed an AHI of 3.0. Usual bedtime 7318-7266. Wakes between 9076-0921 depending on if she works that day. Sleeps in side and prone positions at home.  Commercial insurance-3%  The sleep study procedure and process involved during this appointment was explained to the patient and questions were answered. The patient verbalized understanding.     Deviation to order/protocol and reason: none      Other:None    After the procedure, the patient/family was informed to ensure followup with ordering  clinician for testing results.      Technologist: Toyin Reddy

## 2024-05-17 DIAGNOSIS — G47.19 EXCESSIVE DAYTIME SLEEPINESS: Primary | ICD-10-CM

## 2024-06-20 ENCOUNTER — LAB (OUTPATIENT)
Dept: LAB | Facility: LAB | Age: 44
End: 2024-06-20
Payer: COMMERCIAL

## 2024-06-20 DIAGNOSIS — E03.9 HYPOTHYROIDISM, UNSPECIFIED TYPE: ICD-10-CM

## 2024-06-20 DIAGNOSIS — R53.82 CHRONIC FATIGUE: ICD-10-CM

## 2024-06-20 LAB
25(OH)D3 SERPL-MCNC: 37 NG/ML (ref 30–100)
T3 SERPL-MCNC: 146 NG/DL (ref 60–200)
T4 FREE SERPL-MCNC: 0.89 NG/DL (ref 0.61–1.12)
TSH SERPL-ACNC: 0.7 MIU/L (ref 0.44–3.98)

## 2024-06-20 PROCEDURE — 84480 ASSAY TRIIODOTHYRONINE (T3): CPT

## 2024-06-20 PROCEDURE — 36415 COLL VENOUS BLD VENIPUNCTURE: CPT

## 2024-06-20 PROCEDURE — 84443 ASSAY THYROID STIM HORMONE: CPT

## 2024-06-20 PROCEDURE — 82306 VITAMIN D 25 HYDROXY: CPT

## 2024-06-20 PROCEDURE — 84439 ASSAY OF FREE THYROXINE: CPT

## 2024-07-05 ENCOUNTER — APPOINTMENT (OUTPATIENT)
Dept: OBSTETRICS AND GYNECOLOGY | Facility: CLINIC | Age: 44
End: 2024-07-05
Payer: COMMERCIAL

## 2024-07-09 ENCOUNTER — APPOINTMENT (OUTPATIENT)
Dept: NEUROLOGY | Facility: CLINIC | Age: 44
End: 2024-07-09
Payer: COMMERCIAL

## 2024-07-11 DIAGNOSIS — E03.9 HYPOTHYROIDISM, UNSPECIFIED TYPE: Primary | ICD-10-CM

## 2024-07-11 RX ORDER — LEVOTHYROXINE SODIUM 75 UG/1
75 TABLET ORAL DAILY
Qty: 10 TABLET | Refills: 0 | Status: SHIPPED | OUTPATIENT
Start: 2024-07-11 | End: 2025-07-11

## 2024-07-14 DIAGNOSIS — Z30.9 ENCOUNTER FOR CONTRACEPTIVE MANAGEMENT, UNSPECIFIED: ICD-10-CM

## 2024-07-15 RX ORDER — NORETHINDRONE ACETATE AND ETHINYL ESTRADIOL, ETHINYL ESTRADIOL AND FERROUS FUMARATE 1MG-10(24)
1 KIT ORAL DAILY
Qty: 84 TABLET | Refills: 3 | Status: SHIPPED | OUTPATIENT
Start: 2024-07-15

## 2024-07-18 ENCOUNTER — APPOINTMENT (OUTPATIENT)
Dept: OBSTETRICS AND GYNECOLOGY | Facility: CLINIC | Age: 44
End: 2024-07-18
Payer: COMMERCIAL

## 2024-07-18 VITALS
SYSTOLIC BLOOD PRESSURE: 108 MMHG | BODY MASS INDEX: 23.54 KG/M2 | HEIGHT: 67 IN | DIASTOLIC BLOOD PRESSURE: 66 MMHG | WEIGHT: 150 LBS

## 2024-07-18 DIAGNOSIS — R92.8 ABNORMAL MAMMOGRAM: Primary | ICD-10-CM

## 2024-07-18 DIAGNOSIS — Z12.4 CERVICAL CANCER SCREENING: ICD-10-CM

## 2024-07-18 DIAGNOSIS — Z12.31 ENCOUNTER FOR SCREENING MAMMOGRAM FOR MALIGNANT NEOPLASM OF BREAST: ICD-10-CM

## 2024-07-18 DIAGNOSIS — Z01.419 WELL WOMAN EXAM: ICD-10-CM

## 2024-07-18 PROCEDURE — 3008F BODY MASS INDEX DOCD: CPT | Performed by: ADVANCED PRACTICE MIDWIFE

## 2024-07-18 PROCEDURE — 1036F TOBACCO NON-USER: CPT | Performed by: ADVANCED PRACTICE MIDWIFE

## 2024-07-18 PROCEDURE — 99396 PREV VISIT EST AGE 40-64: CPT | Performed by: ADVANCED PRACTICE MIDWIFE

## 2024-07-18 PROCEDURE — 87624 HPV HI-RISK TYP POOLED RSLT: CPT

## 2024-07-18 ASSESSMENT — PAIN SCALES - GENERAL: PAINLEVEL: 0-NO PAIN

## 2024-07-18 NOTE — PROGRESS NOTES
PAPRAE ( Breast and Pelvic exam )    Last pap: 7.12.19 ( WNL -HPV ) TODAY  Last mammo: 3.7.23 ( NEG )    Chaperone declined: Noemi Santos, LINCOLN    Assessment/Plan   Problem List Items Addressed This Visit             ICD-10-CM       Medium    Abnormal mammogram - Primary R92.8    Relevant Orders    BI mammo bilateral diagnostic tomosynthesis    Well woman exam Z01.419     Pap done  Mammogram ordered    Advised to stop OCP. On Lo loestrin. Discussed low dose can be possibly contributing to mood, fatigue, etc.   Could trial IUD if desired contraception. Could discuss MHT if needed.               Other Visit Diagnoses         Codes    Encounter for screening mammogram for malignant neoplasm of breast     Z12.31    Cervical cancer screening     Z12.4    Relevant Orders    THINPREP PAP TEST            Trisha Sky, ISABEL-ALVINA     Subjective   Ewa Chauhan is a 44 y.o. female who is here for a routine exam.     Still doesn't feel great.   Fatigue, worried about ADHD.   Now on Prozac but feels no different. Has tried Zoloft/Trintellix/Cymbalta/Lexapro in the past. Tried to get tested for ADHD, but was told to do therapy first. Had about 4 sessions, just not interested in this.     Working with endo re: thyroid. Back on Synthroid.     Still chronic fatigue     Lives with:    Current employment: dental assistant   T/E/D: denies  Up to date vision/dental: yes  PCP: yes  Menstrual history: on OCP  Sexual history: monogamous partner   Pregnancy history:  G/P 0  T: P:  EAB:   Ectopic:   SAB:   L:      Diet: trying to lose more weight     PMH, PSH, and Family hx reviewed and updated    Current contraception: OCP (estrogen/progesterone)  Refill Y/N:    Last pap: 2019, due today  History of abnormal Pap smear: no  Family history of breast, uterine,  ovarian cancer: no  Regular self breast exam: no  Last mammogram: 03/2023, due, missed follow up   History of abnormal mammogram: yes -            Review of  "Systems    Objective   /66   Ht 1.702 m (5' 7\")   Wt 68 kg (150 lb)   LMP  (LMP Unknown)   BMI 23.49 kg/m²     Physical Exam  Constitutional:       Appearance: Normal appearance.   HENT:      Head: Normocephalic.   Neck:      Thyroid: No thyroid mass, thyromegaly or thyroid tenderness.   Cardiovascular:      Rate and Rhythm: Normal rate and regular rhythm.   Pulmonary:      Effort: Pulmonary effort is normal.      Breath sounds: Normal breath sounds.   Chest:   Breasts:     Right: Normal. No mass, nipple discharge or tenderness.      Left: Normal. No mass, nipple discharge or tenderness.   Abdominal:      Palpations: Abdomen is soft.   Genitourinary:     General: Normal vulva.      Vagina: Normal.      Cervix: Normal.   Musculoskeletal:         General: Normal range of motion.   Lymphadenopathy:      Upper Body:      Right upper body: No axillary adenopathy.      Left upper body: No axillary adenopathy.   Skin:     General: Skin is warm and dry.   Neurological:      Mental Status: She is alert and oriented to person, place, and time.   Psychiatric:         Mood and Affect: Mood normal.         "

## 2024-07-18 NOTE — ASSESSMENT & PLAN NOTE
Pap done  Mammogram ordered    Advised to stop OCP. On Lo loestrin. Discussed low dose can be possibly contributing to mood, fatigue, etc.   Could trial IUD if desired contraception. Could discuss MHT if needed.

## 2024-07-19 ENCOUNTER — APPOINTMENT (OUTPATIENT)
Dept: PRIMARY CARE | Facility: CLINIC | Age: 44
End: 2024-07-19
Payer: COMMERCIAL

## 2024-07-19 VITALS
DIASTOLIC BLOOD PRESSURE: 72 MMHG | SYSTOLIC BLOOD PRESSURE: 104 MMHG | WEIGHT: 152 LBS | BODY MASS INDEX: 23.81 KG/M2 | TEMPERATURE: 97.6 F

## 2024-07-19 DIAGNOSIS — R53.82 CHRONIC FATIGUE: ICD-10-CM

## 2024-07-19 DIAGNOSIS — F41.8 DEPRESSION WITH ANXIETY: Primary | ICD-10-CM

## 2024-07-19 DIAGNOSIS — F98.8 ATTENTION DEFICIT DISORDER, UNSPECIFIED HYPERACTIVITY PRESENCE: ICD-10-CM

## 2024-07-19 PROCEDURE — 1036F TOBACCO NON-USER: CPT | Performed by: FAMILY MEDICINE

## 2024-07-19 PROCEDURE — 99213 OFFICE O/P EST LOW 20 MIN: CPT | Performed by: FAMILY MEDICINE

## 2024-07-19 RX ORDER — BUPROPION HYDROCHLORIDE 150 MG/1
150 TABLET ORAL EVERY MORNING
Qty: 30 TABLET | Refills: 1 | Status: SHIPPED | OUTPATIENT
Start: 2024-07-19 | End: 2024-09-17

## 2024-07-19 NOTE — PATIENT INSTRUCTIONS
Follow up with the sleep study next week.  If that shows a problem, then it will be dealt with. If it is normal, then start wellbutrin the following day.  Return in one month after being on wellbutrin.  Say off the fluoxetine which has been held for the sleep study anyway.  Adderall to treat possible ADD may be indicated if the wellbutrin is not helping in a month.

## 2024-07-19 NOTE — PROGRESS NOTES
Subjective   Patient ID: [id number]     Ewa Chauhan is a 44 y.o. female who presents for Follow-up (Discuss switching medication.).  HPI  She is here for a recheck on fatigue.      She is being treated for hypothyroidism by endocrinology.      She has seen neurology for the fatigue as well.      She remains on fluoxetine, OC's, sumatriptan, celecoxib, levothyroxine.      She had a sleep study.  She is repeating this after being off fluoxetine.      She stopped taking cytomel after three months because it was not helping.    She feels about the same overall. No better no worse.     It is hard for her to get motivated to do anything.      She saw Dr So for a screening for ADD. He thought it was borderline.  He wanted to try counseling.  She has tried this for other things and it does not really help her.    She complains of trouble concentrating.  She is asking about changing to wellbutrin or increasing the dose of the fluoxetine.    She is considering stopping the OC's to see if that helps her.            Objective     /72 (BP Location: Left arm, Patient Position: Sitting)   Temp 36.4 °C (97.6 °F) (Skin)   Wt 68.9 kg (152 lb)   LMP  (LMP Unknown)   BMI 23.81 kg/m²      Physical Exam  Constitutional:       Appearance: Normal appearance.   Neck:      Comments: Thyroid normal to palpation.  Cardiovascular:      Rate and Rhythm: Normal rate and regular rhythm.   Neurological:      Mental Status: She is alert.   Psychiatric:      Comments: She stopped fluoxetine July 5.  She states her thoughts are slightly more scattered.             Assessment/Plan   Problem List Items Addressed This Visit       Depression with anxiety - Primary    Relevant Medications    buPROPion XL (Wellbutrin XL) 150 mg 24 hr tablet    Fatigue    Relevant Medications    buPROPion XL (Wellbutrin XL) 150 mg 24 hr tablet     Other Visit Diagnoses       Attention deficit disorder, unspecified hyperactivity presence            Follow  up with the sleep study next week.  If that shows a problem, then it will be dealt with. If it is normal, then start wellbutrin the following day.  Return in one month after being on wellbutrin.  Say off the fluoxetine which has been held for the sleep study anyway.  Adderall to treat possible ADD may be indicated if the wellbutrin is not helping in a month.    Se Law, DO

## 2024-07-24 ENCOUNTER — CLINICAL SUPPORT (OUTPATIENT)
Dept: SLEEP MEDICINE | Facility: CLINIC | Age: 44
End: 2024-07-24
Payer: COMMERCIAL

## 2024-07-24 DIAGNOSIS — G47.19 EXCESSIVE DAYTIME SLEEPINESS: ICD-10-CM

## 2024-07-25 ENCOUNTER — PROCEDURE VISIT (OUTPATIENT)
Dept: SLEEP MEDICINE | Facility: CLINIC | Age: 44
End: 2024-07-25
Payer: COMMERCIAL

## 2024-07-25 DIAGNOSIS — G47.19 EXCESSIVE DAYTIME SLEEPINESS: ICD-10-CM

## 2024-07-25 PROCEDURE — 80307 DRUG TEST PRSMV CHEM ANLYZR: CPT

## 2024-07-25 ASSESSMENT — SLEEP AND FATIGUE QUESTIONNAIRES
HOW LIKELY ARE YOU TO NOD OFF OR FALL ASLEEP WHILE SITTING AND TALKING TO SOMEONE: WOULD NEVER DOZE
HOW LIKELY ARE YOU TO NOD OFF OR FALL ASLEEP WHEN YOU ARE A PASSENGER IN A CAR FOR AN HOUR WITHOUT A BREAK: SLIGHT CHANCE OF DOZING
HOW LIKELY ARE YOU TO NOD OFF OR FALL ASLEEP WHILE SITTING AND READING: HIGH CHANCE OF DOZING
HOW LIKELY ARE YOU TO NOD OFF OR FALL ASLEEP WHILE WATCHING TV: HIGH CHANCE OF DOZING
HOW LIKELY ARE YOU TO NOD OFF OR FALL ASLEEP WHILE LYING DOWN TO REST IN THE AFTERNOON WHEN CIRCUMSTANCES PERMIT: HIGH CHANCE OF DOZING
ESS-CHAD TOTAL SCORE: 12
SITING INACTIVE IN A PUBLIC PLACE LIKE A CLASS ROOM OR A MOVIE THEATER: SLIGHT CHANCE OF DOZING
HOW LIKELY ARE YOU TO NOD OFF OR FALL ASLEEP WHILE SITTING QUIETLY AFTER LUNCH WITHOUT ALCOHOL: WOULD NEVER DOZE
HOW LIKELY ARE YOU TO NOD OFF OR FALL ASLEEP IN A CAR, WHILE STOPPED FOR A FEW MINUTES IN TRAFFIC: SLIGHT CHANCE OF DOZING

## 2024-07-25 NOTE — PROGRESS NOTES
[facility] TECH NOTE:     Patient: Ewa Chauhan   MRN//AGE: [id number]  1980  44 y.o.   Technologist: Toyin Reddy   Room: 4   Service Date: 24        Sleep Testing Location: [facility]:     TECHNOLOGIST SLEEP STUDY PROCEDURE NOTE:   This sleep study is being conducted according to the policies and procedures outlined by the AAS accreditation standards.  The sleep study procedure and processes involved during this appointment was explained to the patient/patient’s family, questions were answered. The patient/family verbalized understanding.      The patient is a 44 y.o. year old female scheduled for a diagnostic PSG  with montage of:  SPLIT . she arrived for her appointment.      The study that was ultimately completed was a diagnostic PSG  with montage of:  SPLIT .    The full study Was completed.  Patient questionnaires completed?: yes     Consents signed? yes    Initial Fall Risk Screening:     Ewa has not fallen in the last 6 months. her did not result in injury. Ewa does not have a fear of falling. He does not need assistance with sitting, standing, or walking. she does not need assistance walking in her home. she does not need assistance in an unfamiliar setting. The patient is notusing an assistive device.     Brief Study observations: The patient is a 44 year old female here for a diagnostic PSG with an MSLT to follow. Ewa arrived at 1945 and completed paperwork.   Commercial insurance-3%  The sleep study procedure and process involved during this appointment was explained to the patient and questions were answered. The patient verbalized understanding.     Deviation to order/protocol and reason: none      Other:None    After the procedure, the patient/family was informed to ensure followup with ordering clinician for testing results.      Technologist: Toyin Reddy

## 2024-07-25 NOTE — PROGRESS NOTES
[facility] TECH NOTE:     Patient: Ewa Chauhan   MRN//AGE: [id number]  1980  44 y.o.   Technologist: Lenny Corbin   Room: 4   Service Date: 2024        Sleep Testing Location: [facility]:     TECHNOLOGIST SLEEP STUDY PROCEDURE NOTE:   This sleep study is being conducted according to the policies and procedures outlined by the AAS accreditation standards.  The sleep study procedure and processes involved during this appointment was explained to the patient/patient’s family, questions were answered. The patient/family verbalized understanding.      The patient is a 44 y.o. year old female scheduled for aMSLT/MWT with montage of:  MSLT . she arrived for her appointment.      The study that was ultimately completed was aMSLT/MWT with montage of:  MSLT .    The full study Was completed.  Patient questionnaires completed?: yes     Consents signed? yes    Initial Fall Risk Screening:     Ewa has not fallen in the last 6 months. her did not result in injury. Ewa does not have a fear of falling. He does not need assistance with sitting, standing, or walking. she does not need assistance walking in her home. she does not need assistance in an unfamiliar setting. The patient is notusing an assistive device.     Brief Study observations: 44-year-old female presented alone for an MSLT following PSG.  Five naps were performed.    Deviation to order/protocol and reason: none      If PAP, which was preferred mask/pressure/mode: n/a      Other:None    After the procedure, the patient/family was informed to ensure followup with ordering clinician for testing results.      Technologist: Lenny Corbin

## 2024-07-30 LAB
CYTOLOGY CMNT CVX/VAG CYTO-IMP: NORMAL
HPV HR 12 DNA GENITAL QL NAA+PROBE: NEGATIVE
HPV HR GENOTYPES PNL CVX NAA+PROBE: NEGATIVE
HPV16 DNA SPEC QL NAA+PROBE: NEGATIVE
HPV18 DNA SPEC QL NAA+PROBE: NEGATIVE
LAB AP HPV GENOTYPE QUESTION: YES
LAB AP HPV HR: NORMAL
LABORATORY COMMENT REPORT: NORMAL
PATH REPORT.TOTAL CANCER: NORMAL

## 2024-08-20 ENCOUNTER — HOSPITAL ENCOUNTER (OUTPATIENT)
Dept: RADIOLOGY | Facility: CLINIC | Age: 44
Discharge: HOME | End: 2024-08-20
Payer: COMMERCIAL

## 2024-08-20 VITALS — HEIGHT: 67 IN | WEIGHT: 151.9 LBS | BODY MASS INDEX: 23.84 KG/M2

## 2024-08-20 DIAGNOSIS — R92.8 ABNORMAL MAMMOGRAM: ICD-10-CM

## 2024-08-20 PROCEDURE — 77062 BREAST TOMOSYNTHESIS BI: CPT

## 2024-08-20 PROCEDURE — 77062 BREAST TOMOSYNTHESIS BI: CPT | Performed by: RADIOLOGY

## 2024-08-20 PROCEDURE — 77066 DX MAMMO INCL CAD BI: CPT | Performed by: RADIOLOGY

## 2024-08-22 ENCOUNTER — APPOINTMENT (OUTPATIENT)
Dept: NEUROLOGY | Facility: CLINIC | Age: 44
End: 2024-08-22
Payer: COMMERCIAL

## 2024-08-22 VITALS
WEIGHT: 157.2 LBS | TEMPERATURE: 97.9 F | HEIGHT: 67 IN | HEART RATE: 72 BPM | DIASTOLIC BLOOD PRESSURE: 78 MMHG | BODY MASS INDEX: 24.67 KG/M2 | RESPIRATION RATE: 16 BRPM | SYSTOLIC BLOOD PRESSURE: 120 MMHG

## 2024-08-22 DIAGNOSIS — G43.109 MIGRAINE WITH AURA AND WITHOUT STATUS MIGRAINOSUS, NOT INTRACTABLE: Primary | ICD-10-CM

## 2024-08-22 DIAGNOSIS — G47.19 EXCESSIVE DAYTIME SLEEPINESS: ICD-10-CM

## 2024-08-22 DIAGNOSIS — G47.11 IDIOPATHIC HYPERSOMNIA: ICD-10-CM

## 2024-08-22 PROCEDURE — 99215 OFFICE O/P EST HI 40 MIN: CPT | Performed by: PSYCHIATRY & NEUROLOGY

## 2024-08-22 PROCEDURE — G2211 COMPLEX E/M VISIT ADD ON: HCPCS | Performed by: PSYCHIATRY & NEUROLOGY

## 2024-08-22 PROCEDURE — 1036F TOBACCO NON-USER: CPT | Performed by: PSYCHIATRY & NEUROLOGY

## 2024-08-22 PROCEDURE — 3008F BODY MASS INDEX DOCD: CPT | Performed by: PSYCHIATRY & NEUROLOGY

## 2024-08-22 RX ORDER — MODAFINIL 200 MG/1
200 TABLET ORAL DAILY
Qty: 30 TABLET | Refills: 5 | Status: SHIPPED | OUTPATIENT
Start: 2024-08-22 | End: 2025-02-18

## 2024-08-22 ASSESSMENT — ENCOUNTER SYMPTOMS
DEPRESSION: 0
OCCASIONAL FEELINGS OF UNSTEADINESS: 0
LOSS OF SENSATION IN FEET: 0

## 2024-08-22 NOTE — PATIENT INSTRUCTIONS
The patient is still sleepy during the day and her testing is consistent with idiopathic hypersomnia.  I will give her a trial of modafinil at 200 mg in the morning.  I would like her to call me after she has been on this medicine for 2 weeks to let me know how she is doing.  I did warn her of the possibility of insomnia with this medicine.  The patient needs to sign a CSA.  The patient needs to improve her sleep hygiene, get least 8 hours sleep at night and avoid the supine position.  The patient should not drive while drowsy.  The patient can use symptomatic pain medicines for severe headaches.  I discussed all these issues in detail with the patient and answered all of her questions.  The patient will follow-up with me in 6 months.

## 2024-08-22 NOTE — PROGRESS NOTES
Subjective     Ewa Chauhan 44 y.o.  HPI  The patient states that she has lights out between 8:30 PM and 9 PM.  The patient states that it takes her about 15 to 30 minutes to fall asleep.  The patient will wake for the day at either 345 or 6 AM.  The patient's [place] Sleepiness Scale score today was 12.    The patient had a diagnostic polysomnogram that showed an AHI of 2.9 with a low oxygen saturation of 93%.  The patient had an MSLT the next day that showed an average sleep latency of 6.3 minutes with no REM sleep was noted.  The patient is HLA testing was negative.  She recently had a urine drug screen that I reviewed.    The patient states that her headaches have been well-controlled.  Review of Systems   All other systems reviewed and are negative.       Patient Active Problem List   Diagnosis    Abnormal finding on MRI of brain    Abnormal mammogram    Low back pain    Chronic rhinitis    Dysthymic disorder    Depression with anxiety    Fatigue    Fear of flying    Headache    Hypothyroidism    Leukopenia    Migraine with aura and without status migrainosus, not intractable    Nasal congestion    Nasal obstruction    Poison ivy    Postnasal drip    Tongue deviation    Vitamin D deficiency    Hypertrophy of nasal turbinates    Acquired lymphopenia    Motion sickness    Lymphocyte depletion    Well woman exam        Past Medical History:   Diagnosis Date    Allergic 4/22    Anxiety     Depression     Disease of thyroid gland     Encounter for gynecological examination (general) (routine) without abnormal findings 08/02/2019    Well woman exam    Headache, unspecified 03/28/2019    Headache    Hypertrophy of nasal turbinates     Nasal turbinate hypertrophy    Personal history of other mental and behavioral disorders     History of depression        Past Surgical History:   Procedure Laterality Date    MANDIBLE SURGERY  10/29/2013    Jaw Surgery    OTHER SURGICAL HISTORY  07/17/2020    Rhinoplasty    KAT  TOOTH EXTRACTION          Social History     Socioeconomic History    Marital status:      Spouse name: Not on file    Number of children: Not on file    Years of education: Not on file    Highest education level: Not on file   Occupational History    Not on file   Tobacco Use    Smoking status: Never     Passive exposure: Never    Smokeless tobacco: Never   Vaping Use    Vaping status: Never Used   Substance and Sexual Activity    Alcohol use: Yes     Alcohol/week: 1.0 standard drink of alcohol     Types: 1 Standard drinks or equivalent per week     Comment: 1or less a week    Drug use: Never    Sexual activity: Yes     Partners: Male     Birth control/protection: OCP   Other Topics Concern    Not on file   Social History Narrative    Not on file     Social Determinants of Health     Financial Resource Strain: Not on file   Food Insecurity: Not on file   Transportation Needs: Not on file   Physical Activity: Not on file   Stress: Not on file   Social Connections: Not on file   Intimate Partner Violence: Not on file   Housing Stability: Not on file        Family History   Problem Relation Name Age of Onset    Hypothyroidism Mother Mom     Arthritis Mother Mom     Other (hemmorhagic stroke) Father Dad     Stroke Father Dad     Other (Acute myocardial infarction) Maternal Grandmother      Other (CVA) Maternal Grandmother          Current Outpatient Medications   Medication Instructions    buPROPion XL (WELLBUTRIN XL) 150 mg, oral, Every morning, Do not crush, chew, or split.    celecoxib (CELEBREX) 200 mg, oral, Daily, As needed    levothyroxine (SYNTHROID) 75 mcg, oral, Daily before breakfast    Lo Loestrin Fe 1 mg-10 mcg (24)/10 mcg (2) tablet 1 tablet, oral, Daily, as directed    SUMAtriptan (Imitrex) 25 mg tablet 1 tablet, oral, Take at headache onset and may repeat times 1    Synthroid 75 mcg, oral, Daily, Take on an empty stomach at the same time each day, either 30 to 60 minutes prior to breakfast     "    No Known Allergies     Objective  /78 (BP Location: Right arm)   Pulse 72   Temp 36.6 °C (97.9 °F)   Resp 16   Ht 1.702 m (5' 7\")   Wt 71.3 kg (157 lb 3.2 oz)   BMI 24.62 kg/m²    GENERAL APPEARANCE:  No distress, alert and cooperative.     MENTAL STATE:  Orientation was normal to time, place and person. Recent and remote memory was intact.  Attention span and concentration were normal. Language testing was normal for comprehension, repetition, expression, and naming. Calculation was intact. The patient could correctly interpret a picture, and copy a diagram. General fund of knowledge was intact. Mini-mental status examination was performed with no errors.     CRANIAL NERVES:  Cranial nerves were normal.      CN 2- Visual Acuity  OD: 20/20 (corrected)   OS: 20/20 (corrected); visual fields full to confrontation.      CN 3, 4, 6-  Pupils round, 4 mm in diameter, equally reactive to light. No ptosis. EOMs normal alignment, full range of movement, no nystagmus     CN 5- Facial sensation intact bilaterally. Normal corneal reflexes.      CN 7- Normal and symmetric facial strength. Nasolabial folds symmetric.     CN 8- Hearing intact to finger rub, whisper.      CN 9- Palate elevates symmetrically. Normal gag reflex.      CN 11- Normal strength of shoulder shrug and neck turning      CN 12- Tongue midline, with normal bulk and strength; no fasciculations.     MOTOR:  Motor exam was normal. Muscle bulk and tone were normal in both upper and lower extremities. Muscle strength was 5/5 in distal and proximal muscles in both upper and lower extremities. No fasciculations, tremor or other abnormal movements were present.     GAIT: Station was stable with a normal base and negative Romberg sign. Gait was stable with a normal arm swing and speed. No ataxia, shuffling, steppage or waddling was noted. Tandem gait was intact. Postural reflexes were normal.     Assessment/Plan   The patient is still sleepy during the " day and her testing is consistent with idiopathic hypersomnia.  I will give her a trial of modafinil at 200 mg in the morning.  I would like her to call me after she has been on this medicine for 2 weeks to let me know how she is doing.  I did warn her of the possibility of insomnia with this medicine.  The patient needs to sign a CSA.  The patient needs to improve her sleep hygiene, get least 8 hours sleep at night and avoid the supine position.  The patient should not drive while drowsy.  The patient can use symptomatic pain medicines for severe headaches.  I discussed all these issues in detail with the patient and answered all of her questions.  The patient will follow-up with me in 6 months.

## 2024-08-26 ENCOUNTER — TELEPHONE (OUTPATIENT)
Dept: OBSTETRICS AND GYNECOLOGY | Facility: CLINIC | Age: 44
End: 2024-08-26
Payer: COMMERCIAL

## 2024-08-26 ENCOUNTER — TELEPHONE (OUTPATIENT)
Dept: OBSTETRICS AND GYNECOLOGY | Facility: CLINIC | Age: 44
End: 2024-08-26

## 2024-08-26 NOTE — TELEPHONE ENCOUNTER
Attempted to call pt to provide her with normal mmg findings on recent imaging.  Got her voice mail.   Message left to call office.

## 2024-08-26 NOTE — TELEPHONE ENCOUNTER
Pt returned call to office.   Pt informed mmg normal.   Pt can return to annual yearly screening.

## 2024-08-29 ENCOUNTER — APPOINTMENT (OUTPATIENT)
Dept: ENDOCRINOLOGY | Facility: CLINIC | Age: 44
End: 2024-08-29
Payer: COMMERCIAL

## 2024-08-29 VITALS
BODY MASS INDEX: 24.14 KG/M2 | WEIGHT: 153.8 LBS | HEIGHT: 67 IN | HEART RATE: 120 BPM | SYSTOLIC BLOOD PRESSURE: 102 MMHG | DIASTOLIC BLOOD PRESSURE: 70 MMHG | TEMPERATURE: 97.2 F

## 2024-08-29 DIAGNOSIS — E03.9 HYPOTHYROIDISM, UNSPECIFIED TYPE: ICD-10-CM

## 2024-08-29 PROCEDURE — 99213 OFFICE O/P EST LOW 20 MIN: CPT | Performed by: STUDENT IN AN ORGANIZED HEALTH CARE EDUCATION/TRAINING PROGRAM

## 2024-08-29 PROCEDURE — 1036F TOBACCO NON-USER: CPT | Performed by: STUDENT IN AN ORGANIZED HEALTH CARE EDUCATION/TRAINING PROGRAM

## 2024-08-29 PROCEDURE — 3008F BODY MASS INDEX DOCD: CPT | Performed by: STUDENT IN AN ORGANIZED HEALTH CARE EDUCATION/TRAINING PROGRAM

## 2024-08-29 RX ORDER — LEVOTHYROXINE SODIUM 75 UG/1
75 TABLET ORAL DAILY
Qty: 90 TABLET | Refills: 2 | Status: SHIPPED | OUTPATIENT
Start: 2024-08-29 | End: 2025-08-29

## 2024-08-29 NOTE — PATIENT INSTRUCTIONS
Levothyroxine 75mcg daily     Labs December     Dorys Archer MD  Divison of Endocrinology   [facility]   Phone: [phone]    option 4, then option 1  Fax: [phone]

## 2024-08-29 NOTE — PROGRESS NOTES
43 F PMH: migraine, hypothyroidism      Following up for thryoid    Initially referred by [facility]BYGN for evaluation of thyroid and symptoms of fatigue. Diagnosed sometime in 2022. Since starting replacement, has continued to have symtpoms mainly fatigue and brain fog. symptoms described as unable to normal routine activities due to tiredness     Had sleep study done- that was negative      previous meds;  levothryoxine  armour  then again synthroid    Current regimen:  Sythroid 75mcg   T3 5mcg daily -took for about 2 months but didn't make a difference on her symptoms so stopped     Last TSH was 1.89   Had a home sleep study that was normal   Being worked up for narcolepsy, and will have an in lab study done in May 2024       GYN- on OCP due to irregular periods     Other endocrine causes were negative  Cortisol not low  No DM or vitamin D deficiency       PMH: as above  Famhx: mother hypothyroid, no other autoimmune problems in the family   Social: works as a dental assistant     ROS reviewed and is negative except for pertinent findings noted on HPI    Physical Exam  Constitutional:       Appearance: Normal appearance.   Neck:      Comments: No goiter, no nodule palpated  Cardiovascular:      Rate and Rhythm: Normal rate and regular rhythm.   Musculoskeletal:         General: No swelling.      Cervical back: Neck supple.   Skin:     General: Skin is warm.   Neurological:      General: No focal deficit present.      Mental Status: She is alert and oriented to person, place, and time.         labs and imaging reviewed, pertinent findings listed on HPI and Impression    Problem List Items Addressed This Visit       Hypothyroidism    Relevant Medications    levothyroxine (Synthroid, Levoxyl) 75 mcg tablet    Other Relevant Orders    Thyroid Stimulating Hormone    Thyroxine, Free       Hypothyroidism  Clinically euthyroid today     We have tried switching to brand and adding T3 but patient with persistent symptoms.  She has  done a sleep study and working on medication adjustment for her fatigue     Since our thyroid changes did not make a difference with her fatigue, then can switch back to LT4 generic for cost and dc T3    She is interested in weaning off completely,  but advised to hold off for now since she's currently titrating some of her other meds     LT4 75mcg daily     TFTs in December    Follow up next year

## 2024-09-05 ENCOUNTER — APPOINTMENT (OUTPATIENT)
Dept: PRIMARY CARE | Facility: CLINIC | Age: 44
End: 2024-09-05
Payer: COMMERCIAL

## 2024-09-05 VITALS — DIASTOLIC BLOOD PRESSURE: 70 MMHG | BODY MASS INDEX: 23.96 KG/M2 | SYSTOLIC BLOOD PRESSURE: 116 MMHG | WEIGHT: 153 LBS

## 2024-09-05 DIAGNOSIS — E03.9 ACQUIRED HYPOTHYROIDISM: ICD-10-CM

## 2024-09-05 DIAGNOSIS — F41.8 DEPRESSION WITH ANXIETY: ICD-10-CM

## 2024-09-05 DIAGNOSIS — G47.10 HYPERSOMNIA: ICD-10-CM

## 2024-09-05 DIAGNOSIS — R53.82 CHRONIC FATIGUE: Primary | ICD-10-CM

## 2024-09-05 PROCEDURE — 1036F TOBACCO NON-USER: CPT | Performed by: FAMILY MEDICINE

## 2024-09-05 PROCEDURE — 99214 OFFICE O/P EST MOD 30 MIN: CPT | Performed by: FAMILY MEDICINE

## 2024-09-05 NOTE — PATIENT INSTRUCTIONS
For now, continue the same medicine including the provigil.  You will call neurology today and possibly increase the dose of Provigil.  Return in one month.  If the Provigil continues not to help then possible ADD medication may be considered.

## 2024-09-05 NOTE — PROGRESS NOTES
"Subjective   Patient ID: [id number]     Ewa Chauhan is a 44 y.o. female who presents for 1 month follow up.  HPI  She saw sleep medicine/neurology on 8/22/24 and endocrinology on 8/29/24.  Both reports were reviewed today.      She was placed on Provigil by Dr Selby.      So far, the Provigil is not helping.      She is still feeling very tired.      She will call him today.  She was told he may increase the dose of this does not help.  She has been on it for two weeks without it helping her.  She still feels tired.  She denies side effects but she does get nauseated in the early afternoon.    She does not really feel depressed--\"I just feel tired\".      Her thyroid medication was not changed.      She is working.  She is able to function.  Sometimes she is at a loss of words or cannot focus. She is a dental assistant.         Objective     /70   Wt 69.4 kg (153 lb)   BMI 23.96 kg/m²      Physical Exam  Constitutional:       Appearance: Normal appearance.   Cardiovascular:      Rate and Rhythm: Normal rate and regular rhythm.      Heart sounds: Normal heart sounds. No murmur heard.  Pulmonary:      Effort: Pulmonary effort is normal. No respiratory distress.      Breath sounds: Normal breath sounds.   Neurological:      General: No focal deficit present.      Mental Status: She is alert and oriented to person, place, and time.         Assessment/Plan   Problem List Items Addressed This Visit       Depression with anxiety    Fatigue - Primary    Hypothyroidism     Other Visit Diagnoses       Hypersomnia              For now, continue the same medicine including the provigil.  You will call neurology today and possibly increase the dose of Provigil.  Return in one month.  If the Provigil continues not to help then possible ADD medication may be considered.   Se Law, DO   "

## 2024-09-16 DIAGNOSIS — G47.11 IDIOPATHIC HYPERSOMNIA: ICD-10-CM

## 2024-09-16 RX ORDER — MODAFINIL 200 MG/1
200 TABLET ORAL 2 TIMES DAILY
Qty: 60 TABLET | Refills: 5 | Status: SHIPPED | OUTPATIENT
Start: 2024-09-16

## 2024-09-17 DIAGNOSIS — R53.82 CHRONIC FATIGUE: ICD-10-CM

## 2024-09-17 DIAGNOSIS — F41.8 DEPRESSION WITH ANXIETY: ICD-10-CM

## 2024-09-17 RX ORDER — BUPROPION HYDROCHLORIDE 150 MG/1
150 TABLET ORAL
Qty: 30 TABLET | Refills: 1 | Status: SHIPPED | OUTPATIENT
Start: 2024-09-17

## 2024-10-09 DIAGNOSIS — G47.19 EXCESSIVE DAYTIME SLEEPINESS: ICD-10-CM

## 2024-10-09 DIAGNOSIS — G47.11 IDIOPATHIC HYPERSOMNIA: Primary | ICD-10-CM

## 2024-10-09 RX ORDER — DEXTROAMPHETAMINE SACCHARATE, AMPHETAMINE ASPARTATE, DEXTROAMPHETAMINE SULFATE AND AMPHETAMINE SULFATE 2.5; 2.5; 2.5; 2.5 MG/1; MG/1; MG/1; MG/1
10 TABLET ORAL DAILY
Qty: 30 TABLET | Refills: 0 | Status: SHIPPED | OUTPATIENT
Start: 2024-10-09 | End: 2024-11-08

## 2024-10-18 ENCOUNTER — APPOINTMENT (OUTPATIENT)
Dept: PRIMARY CARE | Facility: CLINIC | Age: 44
End: 2024-10-18
Payer: COMMERCIAL

## 2024-10-18 ENCOUNTER — TELEPHONE (OUTPATIENT)
Dept: PRIMARY CARE | Facility: CLINIC | Age: 44
End: 2024-10-18

## 2024-10-18 VITALS
BODY MASS INDEX: 24.09 KG/M2 | WEIGHT: 153.8 LBS | DIASTOLIC BLOOD PRESSURE: 80 MMHG | TEMPERATURE: 97.4 F | SYSTOLIC BLOOD PRESSURE: 118 MMHG

## 2024-10-18 DIAGNOSIS — L23.7 ALLERGIC CONTACT DERMATITIS DUE TO PLANTS, EXCEPT FOOD: ICD-10-CM

## 2024-10-18 DIAGNOSIS — R53.82 CHRONIC FATIGUE: ICD-10-CM

## 2024-10-18 DIAGNOSIS — E03.9 HYPOTHYROIDISM, UNSPECIFIED TYPE: Primary | ICD-10-CM

## 2024-10-18 DIAGNOSIS — R53.82 CHRONIC FATIGUE: Primary | ICD-10-CM

## 2024-10-18 DIAGNOSIS — G47.10 HYPERSOMNIA: ICD-10-CM

## 2024-10-18 DIAGNOSIS — E03.9 ACQUIRED HYPOTHYROIDISM: ICD-10-CM

## 2024-10-18 PROCEDURE — 99214 OFFICE O/P EST MOD 30 MIN: CPT | Performed by: FAMILY MEDICINE

## 2024-10-18 PROCEDURE — 1036F TOBACCO NON-USER: CPT | Performed by: FAMILY MEDICINE

## 2024-10-18 RX ORDER — PREDNISONE 20 MG/1
TABLET ORAL
Qty: 21 TABLET | Refills: 0 | Status: SHIPPED | OUTPATIENT
Start: 2024-10-18

## 2024-10-18 NOTE — PATIENT INSTRUCTIONS
I ordered steroids for your contact dermatitis.  Follow up with Dr Selby for a recheck as directed by him.  You will get Provigil and adderall prescriptions from him at this point.

## 2024-10-18 NOTE — PROGRESS NOTES
Subjective   Patient ID: [id number]     Ewa Chauhan is a 44 y.o. female who presents for Follow-up (Has rash from english and poison ivy - wonders about a steroid to help).  HPI  She is here for a recheck on chronic fatigue.      We have tried numerous antidepressants without success.      Just prior to her last visit, she was started on Provigil by neurology.  As of last month, it had not been helping yet.      She is following endocrinology and is on thyroid medication.      She messaged her neurologist that the provigil was not helping and he started her on Adderall.  This started Sunday.  She remains on the morning dose of Provigil.  So far, no benefit yet to this treatment in terms of fatigue.    She has a rash.  She was in poison ivy Sunday and had a rash that started Monday and has continued to itch.    She states she does not feel any different on either of the medications.     She is to contact Dr Selby in one week to let him know.             Objective     /80   Temp 36.3 °C (97.4 °F)   Wt 69.8 kg (153 lb 12.8 oz)   BMI 24.09 kg/m²      Physical Exam  Constitutional:       Appearance: Normal appearance.   Cardiovascular:      Rate and Rhythm: Normal rate and regular rhythm.      Heart sounds: Normal heart sounds. No murmur heard.  Pulmonary:      Effort: Pulmonary effort is normal. No respiratory distress.      Breath sounds: Normal breath sounds.   Skin:     Comments: Patchy areas of maculopapular rash.  Both arms and chest. C/w poison ivy.    Neurological:      General: No focal deficit present.      Mental Status: She is alert and oriented to person, place, and time.   Psychiatric:         Mood and Affect: Mood normal.         Assessment/Plan   Problem List Items Addressed This Visit       Fatigue - Primary    Hypothyroidism    Relevant Orders    T3, Reverse     Other Visit Diagnoses       Hypersomnia        Allergic contact dermatitis due to plants, except food        Relevant  Medications    predniSONE (Deltasone) 20 mg tablet          I ordered steroids for your contact dermatitis.  Follow up with Dr Selby for a recheck as directed by him.  You will get Provigil and adderall prescriptions from him at this point.    Se Law, DO

## 2024-10-18 NOTE — TELEPHONE ENCOUNTER
Patient is requesting a referral for a new endocrinologist. She is not happy with her current physician and wanting to schedule with a new office.

## 2024-11-08 ENCOUNTER — LAB (OUTPATIENT)
Dept: LAB | Facility: LAB | Age: 44
End: 2024-11-08
Payer: COMMERCIAL

## 2024-11-08 DIAGNOSIS — G47.19 EXCESSIVE DAYTIME SLEEPINESS: ICD-10-CM

## 2024-11-08 DIAGNOSIS — G47.11 IDIOPATHIC HYPERSOMNIA: ICD-10-CM

## 2024-11-08 DIAGNOSIS — E03.9 ACQUIRED HYPOTHYROIDISM: ICD-10-CM

## 2024-11-08 PROCEDURE — 36415 COLL VENOUS BLD VENIPUNCTURE: CPT

## 2024-11-08 PROCEDURE — 84482 T3 REVERSE: CPT

## 2024-11-08 RX ORDER — DEXTROAMPHETAMINE SACCHARATE, AMPHETAMINE ASPARTATE, DEXTROAMPHETAMINE SULFATE AND AMPHETAMINE SULFATE 5; 5; 5; 5 MG/1; MG/1; MG/1; MG/1
20 TABLET ORAL DAILY
Qty: 30 TABLET | Refills: 0 | Status: SHIPPED | OUTPATIENT
Start: 2024-11-08 | End: 2024-12-08

## 2024-11-08 RX ORDER — DEXTROAMPHETAMINE SACCHARATE, AMPHETAMINE ASPARTATE, DEXTROAMPHETAMINE SULFATE AND AMPHETAMINE SULFATE 2.5; 2.5; 2.5; 2.5 MG/1; MG/1; MG/1; MG/1
20 TABLET ORAL DAILY
Qty: 30 TABLET | Refills: 0 | Status: SHIPPED | OUTPATIENT
Start: 2024-11-08 | End: 2024-12-08

## 2024-11-12 LAB — T3REVERSE SERPL-MCNC: 17.9 NG/DL (ref 9–27)

## 2024-11-12 SDOH — HEALTH STABILITY: PHYSICAL HEALTH: ON AVERAGE, HOW MANY DAYS PER WEEK DO YOU ENGAGE IN MODERATE TO STRENUOUS EXERCISE (LIKE A BRISK WALK)?: 0 DAYS

## 2024-11-14 ENCOUNTER — OFFICE VISIT (OUTPATIENT)
Dept: FAMILY MEDICINE CLINIC | Age: 44
End: 2024-11-14
Payer: COMMERCIAL

## 2024-11-14 VITALS
SYSTOLIC BLOOD PRESSURE: 104 MMHG | HEIGHT: 67 IN | DIASTOLIC BLOOD PRESSURE: 72 MMHG | HEART RATE: 97 BPM | OXYGEN SATURATION: 99 % | TEMPERATURE: 97.6 F | BODY MASS INDEX: 24.55 KG/M2 | WEIGHT: 156.4 LBS

## 2024-11-14 DIAGNOSIS — E60 ZINC DEFICIENCY: ICD-10-CM

## 2024-11-14 DIAGNOSIS — R53.82 CHRONIC FATIGUE: ICD-10-CM

## 2024-11-14 DIAGNOSIS — R53.82 CHRONIC FATIGUE: Primary | ICD-10-CM

## 2024-11-14 DIAGNOSIS — E61.1 IRON DEFICIENCY: ICD-10-CM

## 2024-11-14 DIAGNOSIS — E03.8 OTHER SPECIFIED HYPOTHYROIDISM: ICD-10-CM

## 2024-11-14 DIAGNOSIS — E61.0 COPPER DEFICIENCY: ICD-10-CM

## 2024-11-14 DIAGNOSIS — E53.8 B12 DEFICIENCY: ICD-10-CM

## 2024-11-14 PROBLEM — G43.109 MIGRAINE WITH AURA AND WITHOUT STATUS MIGRAINOSUS, NOT INTRACTABLE: Status: ACTIVE | Noted: 2023-02-23

## 2024-11-14 PROBLEM — E03.9 HYPOTHYROIDISM: Status: ACTIVE | Noted: 2023-02-23

## 2024-11-14 PROBLEM — D72.810 ACQUIRED LYMPHOPENIA: Status: ACTIVE | Noted: 2023-02-23

## 2024-11-14 LAB
CERULOPLASMIN SERPL-MCNC: 32 MG/DL (ref 16–45)
FERRITIN: 40 NG/ML (ref 15–150)
FOLATE: 12.8 NG/ML (ref 4.8–24.2)
IRON % SATURATION: 37 % (ref 20–55)
IRON: 149 UG/DL (ref 37–145)
T3 FREE: 3.1 PG/ML (ref 2–4.4)
T4 FREE SERPL-MCNC: 1.2 NG/DL (ref 0.84–1.68)
TOTAL IRON BINDING CAPACITY: 406 UG/DL (ref 250–450)
TSH SERPL-MCNC: 1.75 UIU/ML (ref 0.44–3.86)
UNSATURATED IRON BINDING CAPACITY: 257 UG/DL (ref 112–347)
VITAMIN B-12: 550 PG/ML (ref 232–1245)

## 2024-11-14 PROCEDURE — 99204 OFFICE O/P NEW MOD 45 MIN: CPT | Performed by: FAMILY MEDICINE

## 2024-11-14 RX ORDER — NORETHINDRONE ACETATE AND ETHINYL ESTRADIOL, ETHINYL ESTRADIOL AND FERROUS FUMARATE 1MG-10(24)
1 KIT ORAL DAILY
COMMUNITY

## 2024-11-14 RX ORDER — BUPROPION HYDROCHLORIDE 150 MG/1
150 TABLET ORAL EVERY MORNING
COMMUNITY

## 2024-11-14 RX ORDER — LEVOTHYROXINE SODIUM 75 UG/1
75 TABLET ORAL DAILY
COMMUNITY
Start: 2024-08-29 | End: 2025-08-29

## 2024-11-14 SDOH — ECONOMIC STABILITY: FOOD INSECURITY: WITHIN THE PAST 12 MONTHS, THE FOOD YOU BOUGHT JUST DIDN'T LAST AND YOU DIDN'T HAVE MONEY TO GET MORE.: NEVER TRUE

## 2024-11-14 SDOH — ECONOMIC STABILITY: FOOD INSECURITY: WITHIN THE PAST 12 MONTHS, YOU WORRIED THAT YOUR FOOD WOULD RUN OUT BEFORE YOU GOT MONEY TO BUY MORE.: NEVER TRUE

## 2024-11-14 SDOH — ECONOMIC STABILITY: INCOME INSECURITY: HOW HARD IS IT FOR YOU TO PAY FOR THE VERY BASICS LIKE FOOD, HOUSING, MEDICAL CARE, AND HEATING?: NOT HARD AT ALL

## 2024-11-14 ASSESSMENT — PATIENT HEALTH QUESTIONNAIRE - PHQ9
1. LITTLE INTEREST OR PLEASURE IN DOING THINGS: NOT AT ALL
SUM OF ALL RESPONSES TO PHQ9 QUESTIONS 1 & 2: 0
SUM OF ALL RESPONSES TO PHQ QUESTIONS 1-9: 0
SUM OF ALL RESPONSES TO PHQ QUESTIONS 1-9: 0
2. FEELING DOWN, DEPRESSED OR HOPELESS: NOT AT ALL
SUM OF ALL RESPONSES TO PHQ QUESTIONS 1-9: 0
SUM OF ALL RESPONSES TO PHQ QUESTIONS 1-9: 0

## 2024-11-14 NOTE — PROGRESS NOTES
Camila Leon (:  1980) is a 44 y.o. female, New patient, here for evaluation of the following chief complaint(s):  Annual Exam (Camila is a 44 year old female who presents to [facility] care.), Thyroid Problem (She has a chronic history of hypothyroidism. She is currently taking levothyroxine 25 mg daily as directed, with minimal effectiveness. States she struggles with chronic fatigue. She has been seeing an endocrinologist for this concern over the past 2 years, but her symptoms have not improved with any change in medication or dosing. She had a reverse T3 drawn on  and other thyroid labs drawn back in .), and Anxiety (She has a chronic history of anxiety and depression. She does not feel stable with her current medication. She has also tried Trintellix, Prozac, and Zoloft in the past with minimal effectiveness. )          Subjective   History of Present Illness  The patient is a 44-year-old female who presents for evaluation of chronic fatigue.    She has been experiencing fatigue since her teenage years, which has progressively worsened over the past decade. Despite undergoing two sleep studies at home and one in a lab, no definitive cause has been identified. She reports that her sleep is not restful and she often feels tired, particularly in the afternoons. She has been taking Adderall 10 mg daily for a month to help stay awake, but this has resulted in hand tremors. She has experienced brain fog and difficulty articulating her thoughts.    She also takes Wellbutrin XR for depression but feels resistant to its effects. She has been on Cymbalta in the past and is considering discontinuing her antidepressants as she feels they are not beneficial.    She has had two episodes of COVID-19, in  and , and experiences annual respiratory issues. She has undergone nose surgeries for a deviated septum, rhinoplasty, and turbinectomy, but still experiences breathing difficulties.    She is

## 2024-11-16 LAB
B BURGDOR IGG SER QL IB: NEGATIVE
B BURGDOR IGM SER QL IB: NEGATIVE
C ALBICANS IGG QN: 0.09 EV
CANDIDA IGA SER IA-ACNC: 0.02 EV
CANDIDA IGM SER IA-ACNC: 0.12 EV
COPPER SERPL-MCNC: 148.4 UG/DL (ref 80–155)
MERCURY BLD-MCNC: <2.5 UG/L
NUCLEAR IGG SER QL IA: DETECTED
ZINC SERPL-MCNC: 79.1 UG/DL (ref 60–120)

## 2024-11-17 LAB
ANA PAT SER IF-IMP: NORMAL
NUCLEAR IGG SER QL IF: NORMAL

## 2024-11-27 DIAGNOSIS — R53.82 CHRONIC FATIGUE: ICD-10-CM

## 2024-11-27 DIAGNOSIS — F41.8 DEPRESSION WITH ANXIETY: ICD-10-CM

## 2024-11-27 RX ORDER — BUPROPION HYDROCHLORIDE 150 MG/1
150 TABLET ORAL
Qty: 30 TABLET | Refills: 1 | Status: SHIPPED | OUTPATIENT
Start: 2024-11-27

## 2024-12-05 ENCOUNTER — OFFICE VISIT (OUTPATIENT)
Dept: FAMILY MEDICINE CLINIC | Age: 44
End: 2024-12-05
Payer: COMMERCIAL

## 2024-12-05 VITALS — HEIGHT: 67 IN | WEIGHT: 156 LBS | BODY MASS INDEX: 24.48 KG/M2

## 2024-12-05 DIAGNOSIS — R53.82 CHRONIC FATIGUE: Primary | ICD-10-CM

## 2024-12-05 PROCEDURE — 99213 OFFICE O/P EST LOW 20 MIN: CPT | Performed by: FAMILY MEDICINE

## 2024-12-05 RX ORDER — MODAFINIL 200 MG/1
200 TABLET ORAL 2 TIMES DAILY
COMMUNITY
Start: 2024-11-14

## 2024-12-05 RX ORDER — DEXTROAMPHETAMINE SACCHARATE, AMPHETAMINE ASPARTATE, DEXTROAMPHETAMINE SULFATE AND AMPHETAMINE SULFATE 5; 5; 5; 5 MG/1; MG/1; MG/1; MG/1
1 TABLET ORAL DAILY
COMMUNITY
Start: 2024-11-08

## 2024-12-05 NOTE — PROGRESS NOTES
Subjective  Camila Leon 1980 is a 44 y.o. female who presents today with:  Chief Complaint   Patient presents with    Follow-up       HPI  I have reviewed HPI and agree with above.  Camila presents for follow-up of her lab work.  She had multiple labs ordered to supplement a very extensive workup to this point.  She takes verapamil and Adderall for her horrible fatigue and debilitating lack of stamina especially as the day goes on.  Noting her lab work really showed was a mildly positive RICK.  There was no reflex done.  The symptoms predate her multiple COVID infections likely started in her teens.  Her Lindsey-Suazo did not show evidence of flare.    Review of Systems   All other systems reviewed and are negative.      Past Medical History:   Diagnosis Date    Chronic back pain     Depression     Hypothyroidism 2022     History reviewed. No pertinent surgical history.  Social History     Socioeconomic History    Marital status:      Spouse name: Not on file    Number of children: Not on file    Years of education: Not on file    Highest education level: Not on file   Occupational History    Not on file   Tobacco Use    Smoking status: Never    Smokeless tobacco: Never   Vaping Use    Vaping status: Never Used   Substance and Sexual Activity    Alcohol use: Yes     Alcohol/week: 1.0 standard drink of alcohol     Types: 1 Drinks containing 0.5 oz of alcohol per week    Drug use: Never    Sexual activity: Yes     Partners: Male   Other Topics Concern    Not on file   Social History Narrative    Not on file     Social Determinants of Health     Financial Resource Strain: Low Risk  (11/14/2024)    Overall Financial Resource Strain (CARDIA)     Difficulty of Paying Living Expenses: Not hard at all   Food Insecurity: No Food Insecurity (11/14/2024)    Hunger Vital Sign     Worried About Running Out of Food in the Last Year: Never true     Ran Out of Food in the Last Year: Never true   Transportation Needs:

## 2024-12-06 DIAGNOSIS — E03.9 HYPOTHYROIDISM, UNSPECIFIED TYPE: ICD-10-CM

## 2024-12-07 RX ORDER — LEVOTHYROXINE SODIUM 75 UG/1
75 TABLET ORAL DAILY
Qty: 90 TABLET | Refills: 2 | Status: SHIPPED | OUTPATIENT
Start: 2024-12-07 | End: 2025-12-07

## 2024-12-18 DIAGNOSIS — G47.11 IDIOPATHIC HYPERSOMNIA: ICD-10-CM

## 2024-12-18 DIAGNOSIS — G47.19 EXCESSIVE DAYTIME SLEEPINESS: ICD-10-CM

## 2024-12-18 RX ORDER — DEXTROAMPHETAMINE SACCHARATE, AMPHETAMINE ASPARTATE, DEXTROAMPHETAMINE SULFATE AND AMPHETAMINE SULFATE 5; 5; 5; 5 MG/1; MG/1; MG/1; MG/1
20 TABLET ORAL DAILY
Qty: 30 TABLET | Refills: 0 | Status: SHIPPED | OUTPATIENT
Start: 2024-12-18 | End: 2025-01-17

## 2025-01-17 ENCOUNTER — APPOINTMENT (OUTPATIENT)
Dept: PRIMARY CARE | Facility: CLINIC | Age: 45
End: 2025-01-17
Payer: COMMERCIAL

## 2025-01-17 VITALS
DIASTOLIC BLOOD PRESSURE: 70 MMHG | WEIGHT: 145 LBS | TEMPERATURE: 97.3 F | SYSTOLIC BLOOD PRESSURE: 110 MMHG | BODY MASS INDEX: 22.71 KG/M2

## 2025-01-17 DIAGNOSIS — R76.8 POSITIVE ANA (ANTINUCLEAR ANTIBODY): ICD-10-CM

## 2025-01-17 DIAGNOSIS — F41.8 DEPRESSION WITH ANXIETY: ICD-10-CM

## 2025-01-17 DIAGNOSIS — E03.9 ACQUIRED HYPOTHYROIDISM: ICD-10-CM

## 2025-01-17 DIAGNOSIS — R53.82 CHRONIC FATIGUE: Primary | ICD-10-CM

## 2025-01-17 LAB
ALBUMIN SERPL BCP-MCNC: 4.9 G/DL (ref 3.4–5)
ALP SERPL-CCNC: 43 U/L (ref 33–110)
ALT SERPL W P-5'-P-CCNC: 11 U/L (ref 7–45)
ANION GAP SERPL CALC-SCNC: 9 MMOL/L (ref 10–20)
AST SERPL W P-5'-P-CCNC: 13 U/L (ref 9–39)
BILIRUB SERPL-MCNC: 1.7 MG/DL (ref 0–1.2)
BUN SERPL-MCNC: 13 MG/DL (ref 6–23)
CALCIUM SERPL-MCNC: 9.3 MG/DL (ref 8.6–10.3)
CHLORIDE SERPL-SCNC: 106 MMOL/L (ref 98–107)
CO2 SERPL-SCNC: 27 MMOL/L (ref 21–32)
CREAT SERPL-MCNC: 0.84 MG/DL (ref 0.5–1.05)
EGFRCR SERPLBLD CKD-EPI 2021: 87 ML/MIN/1.73M*2
GLUCOSE SERPL-MCNC: 98 MG/DL (ref 74–99)
POTASSIUM SERPL-SCNC: 4.4 MMOL/L (ref 3.5–5.3)
PROT SERPL-MCNC: 7.2 G/DL (ref 6.4–8.2)
SODIUM SERPL-SCNC: 138 MMOL/L (ref 136–145)

## 2025-01-17 PROCEDURE — 80053 COMPREHEN METABOLIC PANEL: CPT

## 2025-01-17 PROCEDURE — 99214 OFFICE O/P EST MOD 30 MIN: CPT | Performed by: FAMILY MEDICINE

## 2025-01-17 PROCEDURE — 1036F TOBACCO NON-USER: CPT | Performed by: FAMILY MEDICINE

## 2025-01-17 PROCEDURE — 86038 ANTINUCLEAR ANTIBODIES: CPT

## 2025-01-17 RX ORDER — VENLAFAXINE HYDROCHLORIDE 75 MG/1
75 CAPSULE, EXTENDED RELEASE ORAL DAILY
Qty: 30 CAPSULE | Refills: 1 | Status: SHIPPED | OUTPATIENT
Start: 2025-01-17 | End: 2025-03-18

## 2025-01-17 NOTE — PATIENT INSTRUCTIONS
I will order a new depression medication. I will order liver enzymes which have not been checked in years and due to concerns with a higher copper level which can affect to brain and liver.  You have had a high YULIA titer and further labs will be ordered to evaluate this.  Follow up with Dr Selby as directed.

## 2025-01-17 NOTE — PROGRESS NOTES
"Subjective   Patient ID: [id number]     Ewa Chauhan is a 44 y.o. female who presents for Follow-up (3 month.  Discuss changing medication.  Wellbutrin ineffective.).  HPI  She is here for a recheck.  She has had chronic fatigue.      She is on Adderall.  This was given from Dr Selby.      She stopped the modafanil.  It did not help and it caused her to feel shaky.    She is still on adderall although she does not think it helps.     She is feeling \"blah\".  No happiness.  Fatigue.  Poor mood.      She states her  states that she is always in a bad mood.    She has tried Trintillix, Cymbalta.  Wellbutrin.  Prozac, Lexapro, Zoloft.  She has had no side effects.  They just don't work.    No suicidal ideation.  Small amounts alcohol.  No drug use.    She is too tired to exercise.     She has had three sleep tests.  She is still seeing Dr Selby.        Objective     /70 (BP Location: Left arm, Patient Position: Sitting)   Temp 36.3 °C (97.3 °F) (Skin)   Wt 65.8 kg (145 lb)   BMI 22.71 kg/m²      Physical Exam  Neurological:      General: No focal deficit present.      Mental Status: She is oriented to person, place, and time.   Psychiatric:      Comments: Depressed affect.           Assessment/Plan   Problem List Items Addressed This Visit       Depression with anxiety    Relevant Medications    venlafaxine XR (Effexor-XR) 75 mg 24 hr capsule    Fatigue - Primary    Relevant Orders    Comprehensive metabolic panel    Hypothyroidism     Other Visit Diagnoses       Positive YULIA (antinuclear antibody)        Relevant Orders    YULIA        I will order a new depression medication. I will order liver enzymes which have not been checked in years and due to concerns with a higher copper level which can affect to brain and liver.  You have had a high YULIA titer and further labs will be ordered to evaluate this.  Follow up with Dr Selby as directed.      Se Law,    "

## 2025-01-21 LAB
ANA PATTERN: ABNORMAL
ANA SER QL HEP2 SUBST: POSITIVE
ANA TITR SER IF: ABNORMAL {TITER}

## 2025-02-21 ENCOUNTER — APPOINTMENT (OUTPATIENT)
Dept: PRIMARY CARE | Facility: CLINIC | Age: 45
End: 2025-02-21
Payer: COMMERCIAL

## 2025-02-21 VITALS
BODY MASS INDEX: 22.71 KG/M2 | SYSTOLIC BLOOD PRESSURE: 122 MMHG | WEIGHT: 145 LBS | DIASTOLIC BLOOD PRESSURE: 74 MMHG | TEMPERATURE: 97.7 F

## 2025-02-21 DIAGNOSIS — R53.82 CHRONIC FATIGUE: ICD-10-CM

## 2025-02-21 DIAGNOSIS — R17 JAUNDICE: ICD-10-CM

## 2025-02-21 DIAGNOSIS — F41.8 DEPRESSION WITH ANXIETY: Primary | ICD-10-CM

## 2025-02-21 PROCEDURE — 99214 OFFICE O/P EST MOD 30 MIN: CPT | Performed by: FAMILY MEDICINE

## 2025-02-21 PROCEDURE — 1036F TOBACCO NON-USER: CPT | Performed by: FAMILY MEDICINE

## 2025-02-21 RX ORDER — VENLAFAXINE HYDROCHLORIDE 150 MG/1
150 CAPSULE, EXTENDED RELEASE ORAL DAILY
Qty: 30 CAPSULE | Refills: 1 | Status: SHIPPED | OUTPATIENT
Start: 2025-02-21 | End: 2025-04-22

## 2025-02-21 NOTE — PROGRESS NOTES
Subjective   Patient ID: [id number]     Ewa Chauhan is a 45 y.o. female who presents for Follow-up (One month) and Med Refill.  HPI  She is here for a recheck on fatigue.     She has been following neurology, Dr Selby.      She is on venlafaxine, bupropion, levothyroxine and Adderall.     At her last visit on 1/17/25, LFTs were ordered to evaluate her for Dmitry's disease since her copper level was high.  Her YULIA was high, so auto-immune titers were ordered.      She states she feels a little bit better.  She thinks the effexor helps with her mood.  It doesn't really help the fatigue.      She states the adderall helps a little with fatigue but causes a tremor, so she is on a low dose of that and going to follow up with Dr Selby about that.    No SI.  Depression is improved but still there.         Objective     /74 (BP Location: Right arm, Patient Position: Sitting)   Temp 36.5 °C (97.7 °F) (Skin)   Wt 65.8 kg (145 lb)   BMI 22.71 kg/m²      Physical Exam  Constitutional:       General: She is not in acute distress.     Appearance: Normal appearance. She is not ill-appearing or toxic-appearing.   Neurological:      General: No focal deficit present.      Mental Status: She is alert. Mental status is at baseline.   Psychiatric:         Behavior: Behavior normal.         Thought Content: Thought content normal.      Comments: Depressed affect. No SI.         Assessment/Plan   Problem List Items Addressed This Visit       Depression with anxiety - Primary    Relevant Medications    venlafaxine XR (Effexor-XR) 150 mg 24 hr capsule    Fatigue    Relevant Orders    US abdomen limited liver     Other Visit Diagnoses       Jaundice        Relevant Orders    US abdomen limited liver          I will order a higher dose of the effexor and an ultrasound of the liver.  Return in one or two months for a recheck and sooner if you have any new or worsened problems.   Se Law, DO

## 2025-02-21 NOTE — PATIENT INSTRUCTIONS
I will order a higher dose of the effexor and an ultrasound of the liver.  Return in one or two months for a recheck and sooner if you have any new or worsened problems.

## 2025-02-24 ENCOUNTER — APPOINTMENT (OUTPATIENT)
Dept: NEUROLOGY | Facility: CLINIC | Age: 45
End: 2025-02-24
Payer: COMMERCIAL

## 2025-02-24 VITALS
RESPIRATION RATE: 16 BRPM | BODY MASS INDEX: 22.76 KG/M2 | SYSTOLIC BLOOD PRESSURE: 112 MMHG | HEIGHT: 67 IN | HEART RATE: 102 BPM | TEMPERATURE: 97 F | DIASTOLIC BLOOD PRESSURE: 76 MMHG | WEIGHT: 145 LBS

## 2025-02-24 DIAGNOSIS — G47.11 IDIOPATHIC HYPERSOMNIA: Primary | ICD-10-CM

## 2025-02-24 DIAGNOSIS — G43.109 MIGRAINE WITH AURA AND WITHOUT STATUS MIGRAINOSUS, NOT INTRACTABLE: ICD-10-CM

## 2025-02-24 DIAGNOSIS — R53.83 OTHER FATIGUE: ICD-10-CM

## 2025-02-24 DIAGNOSIS — G47.19 EXCESSIVE DAYTIME SLEEPINESS: ICD-10-CM

## 2025-02-24 PROCEDURE — 99215 OFFICE O/P EST HI 40 MIN: CPT | Performed by: PSYCHIATRY & NEUROLOGY

## 2025-02-24 PROCEDURE — 1036F TOBACCO NON-USER: CPT | Performed by: PSYCHIATRY & NEUROLOGY

## 2025-02-24 PROCEDURE — 3008F BODY MASS INDEX DOCD: CPT | Performed by: PSYCHIATRY & NEUROLOGY

## 2025-02-24 ASSESSMENT — ENCOUNTER SYMPTOMS
DEPRESSION: 0
LOSS OF SENSATION IN FEET: 0
OCCASIONAL FEELINGS OF UNSTEADINESS: 0

## 2025-02-24 NOTE — PATIENT INSTRUCTIONS
The patient is still sleepy during the day and she feels fatigued.  I did order a hepatitis panel.  The patient had a abnormal YULIA panel needs follow-up with her primary care doctor for that.  I will have the patient screened for enrollment in the Recovery trial.  If she is either not eligible or does not wish to pursue this trial and we can consider treatment with sodium oxybate.  The patient needs to improve her sleep hygiene, get least 8 hours sleep at night and avoid the supine position.  The patient should not drive while drowsy.  The patient can use symptomatic pain medicines for severe headaches.  I discussed all these issues in detail with the patient and answered all of her questions.  The patient will follow-up with me in 6 months.

## 2025-02-24 NOTE — PROGRESS NOTES
Subjective     Ewa Chauhan 45 y.o.  HPI  The patient states that she has lights out at 9:30 PM and it takes her between 30 to 40 minutes to fall asleep.  The patient will wake for the day at 6 AM.  She does not feel that his sleep is refreshing.  The patient's Leedey Sleepiness Scale score today was 7.    The patient states that she tried modafinil 200 mg twice daily without any improvement.  The patient states that she also tried Adderall but feels that it made her hand shaking did not improve her sleepiness.    The patient states that she also feels fatigued where she has a low energy level besides the fact that she does feel sleepy.    The patient did have an abnormal Linda-Barr virus titer in 2023.      The patient has had ceruloplasmin, copper, zinc, mercury, ferritin, vitamin B12, folate and TSH and these were all normal.  The patient did have a negative Lyme titer.  The patient did have a positive YULIA titer.      OARRS:  No data recorded  I have personally reviewed the OARRS report for Ewa Chauhan. I have considered the risks of abuse, dependence, addiction and diversion    Is the patient prescribed a combination of a benzodiazepine and opioid?  Yes, I feel it is clincially indicated to continue the medication and have discussed with the patient risks/benefits/alternatives.    Last Urine Drug Screen / ordered today: No  No results found for this or any previous visit (from the past 8760 hour(s)).  Results are as expected.     Controlled Substance Agreement:  Date of the Last Agreement:   Reviewed Controlled Substance Agreement including but not limited to the benefits, risks, and alternatives to treatment with a Controlled Substance medication(s).    Stimulants:   What is the patient's goal of therapy?  Maintain wakefulness  Is this being achieved with current treatment?  Yes    Activities of Daily Living:   Is your overall impression that this patient is benefiting (symptom reduction outweighs  side effects) from stimulant therapy? Yes     1. Physical Functioning: Better  2. Family Relationship: Better  3. Social Relationship: Better  4. Mood: Better  5. Sleep Patterns: Better  6. Overall Function: Better      Review of Systems     Patient Active Problem List   Diagnosis    Abnormal finding on MRI of brain    Abnormal mammogram    Low back pain    Chronic rhinitis    Dysthymic disorder    Depression with anxiety    Fatigue    Fear of flying    Headache    Hypothyroidism    Leukopenia    Migraine with aura and without status migrainosus, not intractable    Nasal congestion    Nasal obstruction    Poison ivy    Postnasal drip    Tongue deviation    Vitamin D deficiency    Hypertrophy of nasal turbinates    Acquired lymphopenia    Motion sickness    Lymphocyte depletion    Well woman exam        Past Medical History:   Diagnosis Date    Allergic 4/22    Anxiety     Depression     Disease of thyroid gland     Encounter for gynecological examination (general) (routine) without abnormal findings 08/02/2019    Well woman exam    Headache, unspecified 03/28/2019    Headache    Hypertrophy of nasal turbinates     Nasal turbinate hypertrophy    Personal history of other mental and behavioral disorders     History of depression        Past Surgical History:   Procedure Laterality Date    MANDIBLE SURGERY  10/29/2013    Jaw Surgery    OTHER SURGICAL HISTORY  07/17/2020    Rhinoplasty    WISDOM TOOTH EXTRACTION          Social History     Socioeconomic History    Marital status:      Spouse name: Not on file    Number of children: Not on file    Years of education: Not on file    Highest education level: Not on file   Occupational History    Not on file   Tobacco Use    Smoking status: Never     Passive exposure: Never    Smokeless tobacco: Never   Vaping Use    Vaping status: Never Used   Substance and Sexual Activity    Alcohol use: Not Currently     Comment: socially    Drug use: Never    Sexual activity: Yes      Partners: Male     Birth control/protection: OCP   Other Topics Concern    Not on file   Social History Narrative    Not on file     Social Drivers of Health     Financial Resource Strain: Low Risk  (11/14/2024)    Received from [email or web address]    Overall Financial Resource Strain (CARDIA)     Difficulty of Paying Living Expenses: Not hard at all   Food Insecurity: No Food Insecurity (11/14/2024)    Received from [facility]    Hunger Vital Sign     Worried About Running Out of Food in the Last Year: Never true     Ran Out of Food in the Last Year: Never true   Transportation Needs: Unknown (11/14/2024)    Received from [facility]    PRAPARE - Transportation     Lack of Transportation (Medical): Not on file     Lack of Transportation (Non-Medical): No   Physical Activity: Unknown (11/12/2024)    Received from [email or web address]    Exercise Vital Sign     Days of Exercise per Week: 0 days     Minutes of Exercise per Session: Not on file   Stress: Not on file   Social Connections: Not on file   Intimate Partner Violence: Not on file   Housing Stability: Unknown (11/14/2024)    Received from [email or web address]    Housing Stability Vital Sign     Unable to Pay for Housing in the Last Year: Not on file     Number of Times Moved in the Last Year: Not on file     Homeless in the Last Year: No        Family History   Problem Relation Name Age of Onset    Hypothyroidism Mother Mom     Arthritis Mother Mom     Other (hemmorhagic stroke) Father Dad     Stroke Father Dad     Other (Acute myocardial infarction) Maternal Grandmother      Other (CVA) Maternal Grandmother          Current Outpatient Medications   Medication Instructions    amphetamine-dextroamphetamine (Adderall) 10 mg tablet 20 mg, oral, Daily    levothyroxine (SYNTHROID, LEVOXYL) 75 mcg, oral, Daily, Take on an empty stomach at the same time each day, either 30 to 60  "minutes prior to breakfast    venlafaxine XR (EFFEXOR-XR) 150 mg, oral, Daily, Take with food.        No Known Allergies     Objective  /76 (BP Location: Right arm)   Pulse 102   Temp 36.1 °C (97 °F)   Resp 16   Ht 1.702 m (5' 7\")   Wt 65.8 kg (145 lb)   BMI 22.71 kg/m²    GENERAL APPEARANCE:  No distress, alert and cooperative.     MENTAL STATE:  Orientation was normal to time, place and person. Recent and remote memory was intact.  Attention span and concentration were normal. Language testing was normal for comprehension, repetition, expression, and naming. Calculation was intact. The patient could correctly interpret a picture, and copy a diagram. General fund of knowledge was intact. Mini-mental status examination was performed with no errors.     CRANIAL NERVES:  Cranial nerves were normal.      CN 2- Visual Acuity  OD: 20/20 (corrected)   OS: 20/20 (corrected); visual fields full to confrontation.      CN 3, 4, 6-  Pupils round, 4 mm in diameter, equally reactive to light. No ptosis. EOMs normal alignment, full range of movement, no nystagmus     CN 5- Facial sensation intact bilaterally. Normal corneal reflexes.      CN 7- Normal and symmetric facial strength. Nasolabial folds symmetric.     CN 8- Hearing intact to finger rub, whisper.      CN 9- Palate elevates symmetrically. Normal gag reflex.      CN 11- Normal strength of shoulder shrug and neck turning      CN 12- Tongue midline, with normal bulk and strength; no fasciculations.     MOTOR:  Motor exam was normal. Muscle bulk and tone were normal in both upper and lower extremities. Muscle strength was 5/5 in distal and proximal muscles in both upper and lower extremities. No fasciculations, tremor or other abnormal movements were present.     GAIT: Station was stable with a normal base and negative Romberg sign. Gait was stable with a normal arm swing and speed. No ataxia, shuffling, steppage or waddling was noted. Tandem gait was intact. " Postural reflexes were normal.     Assessment/Plan   The patient is still sleepy during the day and she feels fatigued.  I did order a hepatitis panel.  The patient had a abnormal YULIA panel needs follow-up with her primary care doctor for that.  I will have the patient screened for enrollment in the Recovery trial.  If she is either not eligible or does not wish to pursue this trial and we can consider treatment with sodium oxybate.  The patient needs to improve her sleep hygiene, get least 8 hours sleep at night and avoid the supine position.  The patient should not drive while drowsy.  The patient can use symptomatic pain medicines for severe headaches.  I discussed all these issues in detail with the patient and answered all of her questions.  The patient will follow-up with me in 6 months.   02-Dec-2022

## 2025-03-15 LAB
T4 FREE SERPL-MCNC: 1.4 NG/DL (ref 0.8–1.8)
TSH SERPL-ACNC: 0.35 MIU/L

## 2025-03-19 LAB
HAV IGM SERPL QL IA: NORMAL
HBV CORE IGM SERPL QL IA: NORMAL
HBV SURFACE AB SERPL IA-ACNC: 55 MIU/ML
HBV SURFACE AG SERPL QL IA: NORMAL
HCV AB SERPL QL IA: NORMAL
HDV AB SER QL: NEGATIVE
HDV IGM SER QL IA: NEGATIVE
HDV RNA SER NAA+PROBE-LOG IU: NOT DETECTED LOG IU/ML
HDV RNA SERPL NAA+PROBE-ACNC: NOT DETECTED IU/ML

## 2025-03-21 ENCOUNTER — HOSPITAL ENCOUNTER (OUTPATIENT)
Dept: RADIOLOGY | Facility: CLINIC | Age: 45
Discharge: HOME | End: 2025-03-21
Payer: COMMERCIAL

## 2025-03-21 DIAGNOSIS — R53.82 CHRONIC FATIGUE: ICD-10-CM

## 2025-03-21 DIAGNOSIS — R17 JAUNDICE: ICD-10-CM

## 2025-03-21 PROCEDURE — 76705 ECHO EXAM OF ABDOMEN: CPT

## 2025-03-24 DIAGNOSIS — E03.9 HYPOTHYROIDISM, UNSPECIFIED TYPE: Primary | ICD-10-CM

## 2025-04-18 ENCOUNTER — APPOINTMENT (OUTPATIENT)
Dept: PRIMARY CARE | Facility: CLINIC | Age: 45
End: 2025-04-18
Payer: COMMERCIAL

## 2025-04-18 VITALS
TEMPERATURE: 97.8 F | DIASTOLIC BLOOD PRESSURE: 80 MMHG | SYSTOLIC BLOOD PRESSURE: 120 MMHG | BODY MASS INDEX: 21.61 KG/M2 | WEIGHT: 138 LBS

## 2025-04-18 DIAGNOSIS — R76.8 POSITIVE ANA (ANTINUCLEAR ANTIBODY): ICD-10-CM

## 2025-04-18 DIAGNOSIS — F90.0 ATTENTION DEFICIT HYPERACTIVITY DISORDER (ADHD), PREDOMINANTLY INATTENTIVE TYPE: ICD-10-CM

## 2025-04-18 DIAGNOSIS — F98.8 ATTENTION DEFICIT DISORDER, UNSPECIFIED TYPE: ICD-10-CM

## 2025-04-18 DIAGNOSIS — R53.82 CHRONIC FATIGUE: Primary | ICD-10-CM

## 2025-04-18 DIAGNOSIS — F41.8 DEPRESSION WITH ANXIETY: ICD-10-CM

## 2025-04-18 DIAGNOSIS — M25.50 ARTHRALGIA, UNSPECIFIED JOINT: ICD-10-CM

## 2025-04-18 PROCEDURE — 1036F TOBACCO NON-USER: CPT | Performed by: FAMILY MEDICINE

## 2025-04-18 PROCEDURE — 99214 OFFICE O/P EST MOD 30 MIN: CPT | Performed by: FAMILY MEDICINE

## 2025-04-18 PROCEDURE — G8433 SCR FOR DEP NOT CPT DOC RSN: HCPCS | Performed by: FAMILY MEDICINE

## 2025-04-18 RX ORDER — VENLAFAXINE HYDROCHLORIDE 150 MG/1
150 CAPSULE, EXTENDED RELEASE ORAL DAILY
Qty: 30 CAPSULE | Refills: 5 | Status: SHIPPED | OUTPATIENT
Start: 2025-04-18 | End: 2025-10-15

## 2025-04-18 RX ORDER — LISDEXAMFETAMINE DIMESYLATE 20 MG/1
20 CAPSULE ORAL EVERY MORNING
Qty: 30 CAPSULE | Refills: 0 | Status: SHIPPED | OUTPATIENT
Start: 2025-04-18 | End: 2025-05-18

## 2025-04-18 NOTE — PATIENT INSTRUCTIONS
I refilled your effexor.  I referred you to rheumatology due to your high YULIA titer.  I ordered a trial of Vyvanse to see if that helps without giving you the side effects of Adderall.  Return in one month for a recheck and sooner if you have any new or worsened problems.

## 2025-04-18 NOTE — PROGRESS NOTES
"Subjective   Patient ID: [id number]     Ewa Chauhan is a 45 y.o. female who presents for Follow-up and Med Refill.  HPI  She is here for a recheck.  She has had longstanding fatigue.      She has tried modafinil and and Adderall, which did not help.      She has had ongoing fatigue which persist.  She is happy with the medication.  She feels \"less grumpy and more even-keeled\" but still tired.    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    Objective     /80 (BP Location: Right arm, Patient Position: Sitting)   Temp 36.6 °C (97.8 °F) (Skin)   Wt 62.6 kg (138 lb)   BMI 21.61 kg/m²      Physical Exam  Constitutional:       Appearance: Normal appearance.   Abdominal:      General: There is no distension.      Palpations: Abdomen is soft. There is no mass.      Tenderness: There is no abdominal tenderness.   Musculoskeletal:      Comments: DIP/PIP aching.  No synovitis or effusion appreciated.   Skin:     Findings: No rash.   Neurological:      Mental Status: She is alert.         Assessment/Plan   Problem List Items Addressed This Visit       Depression with anxiety    Relevant Medications    venlafaxine XR (Effexor-XR) 150 mg 24 hr capsule    Fatigue - Primary    Relevant Orders    Referral to Rheumatology     Other Visit Diagnoses         Positive YULIA (antinuclear antibody)        Relevant Orders    Referral to Rheumatology      Arthralgia, unspecified joint        Relevant Orders    Referral to Rheumatology      Attention deficit hyperactivity disorder (ADHD), predominantly inattentive type          Attention deficit disorder, unspecified type        Relevant Medications    lisdexamfetamine (Vyvanse) 20 mg capsule        I refilled your effexor.  I referred you to rheumatology due to your high YULIA titer.  I ordered a trial of Vyvanse to see if that helps without giving you the side effects of Adderall.  Return in one month for a recheck and sooner if you have " any new or worsened problems.     Se Law, DO

## 2025-05-23 ENCOUNTER — APPOINTMENT (OUTPATIENT)
Dept: PRIMARY CARE | Facility: CLINIC | Age: 45
End: 2025-05-23
Payer: COMMERCIAL

## 2025-05-23 VITALS
SYSTOLIC BLOOD PRESSURE: 110 MMHG | DIASTOLIC BLOOD PRESSURE: 70 MMHG | BODY MASS INDEX: 21.3 KG/M2 | TEMPERATURE: 97.5 F | WEIGHT: 136 LBS

## 2025-05-23 DIAGNOSIS — F41.8 DEPRESSION WITH ANXIETY: Primary | ICD-10-CM

## 2025-05-23 DIAGNOSIS — R53.82 CHRONIC FATIGUE: ICD-10-CM

## 2025-05-23 DIAGNOSIS — F90.0 ATTENTION DEFICIT HYPERACTIVITY DISORDER (ADHD), PREDOMINANTLY INATTENTIVE TYPE: ICD-10-CM

## 2025-05-23 DIAGNOSIS — F98.8 ATTENTION DEFICIT DISORDER, UNSPECIFIED TYPE: ICD-10-CM

## 2025-05-23 PROCEDURE — 1036F TOBACCO NON-USER: CPT | Performed by: FAMILY MEDICINE

## 2025-05-23 PROCEDURE — 99213 OFFICE O/P EST LOW 20 MIN: CPT | Performed by: FAMILY MEDICINE

## 2025-05-23 PROCEDURE — G8433 SCR FOR DEP NOT CPT DOC RSN: HCPCS | Performed by: FAMILY MEDICINE

## 2025-05-23 RX ORDER — LISDEXAMFETAMINE DIMESYLATE 30 MG/1
30 CAPSULE ORAL EVERY MORNING
Qty: 30 CAPSULE | Refills: 0 | Status: SHIPPED | OUTPATIENT
Start: 2025-05-23 | End: 2025-06-22

## 2025-05-23 NOTE — PATIENT INSTRUCTIONS
It is good to hear the Vyvanse is helping you a little bit.  I will increase the dose of this from 20 mg daily to 30 mg daily.  Continue the venlafaxine and levothyroxine.  Return in one month for a recheck.

## 2025-05-23 NOTE — PROGRESS NOTES
"Subjective   Patient ID: [id number]     Ewa Chauhan is a 45 y.o. female who presents for Follow-up (1 month.  Discuss increasing medication dosage.).  HPI  She is here for a recheck.  She has had a longstanding history of depression and chronic fatigue.  She was seen one month ago and started on Vyvanse.  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      She is on effexor which helps with the depression.      She states the Vyvanse is \"a little helpful\". She denies any side effects.  She is still tired but she can get through the day.      No tremors or jitteriness.  She can do her job ok.    No palpitations, chest pain.  No anxiety.  Depression is fairly well controlled.         Medications Ordered Prior to Encounter[1]    Objective     /70   Temp 36.4 °C (97.5 °F) (Skin)   Wt 61.7 kg (136 lb)   BMI 21.30 kg/m²      Physical Exam  Constitutional:       Appearance: Normal appearance.   Cardiovascular:      Rate and Rhythm: Normal rate and regular rhythm.      Heart sounds: Normal heart sounds. No murmur heard.  Pulmonary:      Effort: Pulmonary effort is normal. No respiratory distress.      Breath sounds: Normal breath sounds.   Neurological:      General: No focal deficit present.      Mental Status: She is alert and oriented to person, place, and time.   Psychiatric:         Mood and Affect: Mood normal.         Assessment/Plan   Problem List Items Addressed This Visit       Depression with anxiety - Primary    Fatigue     Other Visit Diagnoses         Attention deficit hyperactivity disorder (ADHD), predominantly inattentive type          Attention deficit disorder, unspecified type        Relevant Medications    lisdexamfetamine (Vyvanse) 30 mg capsule        It is good to hear the Vyvanse is helping you a little bit.  I will increase the dose of this from 20 mg daily to 30 mg daily.  Continue the venlafaxine and levothyroxine.  " Return in one month for a recheck.     Se Law DO          [1]   Current Outpatient Medications on File Prior to Visit   Medication Sig Dispense Refill    levothyroxine (Synthroid, Levoxyl) 75 mcg tablet Take 1 tablet (75 mcg) by mouth early in the morning.. Take on an empty stomach at the same time each day, either 30 to 60 minutes prior to breakfast 90 tablet 2    venlafaxine XR (Effexor-XR) 150 mg 24 hr capsule Take 1 capsule (150 mg) by mouth once daily. Take with food. 30 capsule 5    [DISCONTINUED] lisdexamfetamine (Vyvanse) 20 mg capsule Take 1 capsule (20 mg) by mouth once daily in the morning. 30 capsule 0     No current facility-administered medications on file prior to visit.

## 2025-06-02 DIAGNOSIS — E03.9 HYPOTHYROIDISM, UNSPECIFIED TYPE: ICD-10-CM

## 2025-06-12 ENCOUNTER — APPOINTMENT (OUTPATIENT)
Dept: ENDOCRINOLOGY | Facility: CLINIC | Age: 45
End: 2025-06-12
Payer: COMMERCIAL

## 2025-06-12 VITALS
HEIGHT: 67 IN | TEMPERATURE: 97.9 F | SYSTOLIC BLOOD PRESSURE: 116 MMHG | WEIGHT: 139.2 LBS | BODY MASS INDEX: 21.85 KG/M2 | HEART RATE: 81 BPM | DIASTOLIC BLOOD PRESSURE: 72 MMHG

## 2025-06-12 DIAGNOSIS — E03.9 PRIMARY HYPOTHYROIDISM: Primary | ICD-10-CM

## 2025-06-12 DIAGNOSIS — R53.83 OTHER FATIGUE: ICD-10-CM

## 2025-06-12 LAB
T4 FREE SERPL-MCNC: 1.2 NG/DL (ref 0.8–1.8)
TSH SERPL-ACNC: 1.04 MIU/L

## 2025-06-12 PROCEDURE — 1036F TOBACCO NON-USER: CPT | Performed by: STUDENT IN AN ORGANIZED HEALTH CARE EDUCATION/TRAINING PROGRAM

## 2025-06-12 PROCEDURE — 3008F BODY MASS INDEX DOCD: CPT | Performed by: STUDENT IN AN ORGANIZED HEALTH CARE EDUCATION/TRAINING PROGRAM

## 2025-06-12 PROCEDURE — 99213 OFFICE O/P EST LOW 20 MIN: CPT | Performed by: STUDENT IN AN ORGANIZED HEALTH CARE EDUCATION/TRAINING PROGRAM

## 2025-06-12 NOTE — PROGRESS NOTES
45 F PMH: migraine, hypothyroidism      Following up for thryoid    Initially referred by OBJOHNNAN for evaluation of thyroid and symptoms of fatigue. Diagnosed sometime in 2022. Since starting replacement, has continued to have symtpoms mainly fatigue and brain fog. symptoms described as unable to normal routine activities due to tiredness.  She has the desire to be able to do activity but is limited due to symptoms     Had sleep study done- that was negative      previous meds;  levothryoxine  armour  then again synthroid    Current regimen:  LT4 75mcg -7.5 pills/week    previously  T3 5mcg daily -took for about 2 months but didn't make a difference on her symptoms so stopped     Last TSH was 1.89   Had a home sleep study that was normal, reportedly narcolepsy work up was negative     GYN- on OCP due to irregular periods  Stopped in 11/2024   Having regular periods currenlty     Other endocrine causes were negative  Cortisol not low  No DM or vitamin D deficiency     Supplement:  Bromeleine  Turmeric         PMH: as above  Famhx: mother hypothyroid, no other autoimmune problems in the family   Social: works as a dental assistant     No hot flashes  No nights   ROS reviewed and is negative except for pertinent findings noted on HPI    Physical Exam  Constitutional:       Appearance: Normal appearance.   Neck:      Comments: No goiter, no nodule palpated  Cardiovascular:      Rate and Rhythm: Normal rate and regular rhythm.   Abdominal:      Palpations: Abdomen is soft.   Musculoskeletal:         General: No swelling.      Cervical back: Neck supple.   Skin:     General: Skin is warm.   Neurological:      General: No focal deficit present.      Mental Status: She is alert and oriented to person, place, and time.   Psychiatric:         Behavior: Behavior normal.         labs and imaging reviewed, pertinent findings listed on HPI and Impression    Problem List Items Addressed This Visit       Fatigue    Primary  hypothyroidism - Primary         Primary Hypothyroidism      We have tried switching to brand and adding T3 but patient with persistent symptoms.      Since our thyroid changes did not make a difference with her fatigue, then can switch back to LT4 generic for cost and dc T3    No known secondary cause of her severe fatigue, she does not seem to be depressed she could tell the difference in symptoms     TFTs now     Advised to restart OCP if having perimenopausal symptoms to avoid worsening of health    Follow up in about 8 months

## 2025-06-12 NOTE — PATIENT INSTRUCTIONS
Continue same dose of levothyroxine for now    Repeat labs   Hold biotin about 3-5 days prior to testing     Follow up a year     Dorys Archer MD  Divison of Endocrinology   [facility]   Phone: [phone]    option 4, then option 1  Fax: [phone]

## 2025-06-23 DIAGNOSIS — E03.9 HYPOTHYROIDISM, UNSPECIFIED TYPE: ICD-10-CM

## 2025-06-23 RX ORDER — LEVOTHYROXINE SODIUM 75 UG/1
TABLET ORAL
Qty: 90 TABLET | Refills: 2 | Status: SHIPPED | OUTPATIENT
Start: 2025-06-23

## 2025-06-26 ENCOUNTER — APPOINTMENT (OUTPATIENT)
Dept: PRIMARY CARE | Facility: CLINIC | Age: 45
End: 2025-06-26
Payer: COMMERCIAL

## 2025-06-26 VITALS
SYSTOLIC BLOOD PRESSURE: 124 MMHG | DIASTOLIC BLOOD PRESSURE: 78 MMHG | WEIGHT: 141 LBS | TEMPERATURE: 97.2 F | BODY MASS INDEX: 22.08 KG/M2

## 2025-06-26 DIAGNOSIS — F41.8 DEPRESSION WITH ANXIETY: ICD-10-CM

## 2025-06-26 DIAGNOSIS — F98.8 ATTENTION DEFICIT DISORDER, UNSPECIFIED TYPE: Primary | ICD-10-CM

## 2025-06-26 DIAGNOSIS — H91.93 BILATERAL HEARING LOSS, UNSPECIFIED HEARING LOSS TYPE: ICD-10-CM

## 2025-06-26 DIAGNOSIS — R53.82 CHRONIC FATIGUE: ICD-10-CM

## 2025-06-26 PROCEDURE — 99213 OFFICE O/P EST LOW 20 MIN: CPT | Performed by: FAMILY MEDICINE

## 2025-06-26 PROCEDURE — G8433 SCR FOR DEP NOT CPT DOC RSN: HCPCS | Performed by: FAMILY MEDICINE

## 2025-06-26 PROCEDURE — 1036F TOBACCO NON-USER: CPT | Performed by: FAMILY MEDICINE

## 2025-06-26 RX ORDER — LISDEXAMFETAMINE DIMESYLATE 30 MG/1
30 CAPSULE ORAL EVERY MORNING
Qty: 30 CAPSULE | Refills: 0 | Status: SHIPPED | OUTPATIENT
Start: 2025-06-26 | End: 2025-07-26

## 2025-06-26 NOTE — PATIENT INSTRUCTIONS
I will refill your Vyvanse at the same dose 30 mg daily.  Return in three months for a recheck.

## 2025-06-26 NOTE — PROGRESS NOTES
"Subjective   Patient ID: [id number]     Ewa Chauhan is a 45 y.o. female who presents for Follow-up (One month) and Med Refill.  HPI  She is here for a one month recheck.  She has had longstanding fatigue.  Vyvanse was recently started and it helped her a little bit.  She was increased from 20 mg daily to 30 mg daily last month.  She is here for a recheck.    She remains on Effexor which help her for depression but does not help her chronic fatigue.    She does not feel much better than last month. The 30 mg dose causes her mild jitteriness. The jitteriness is not bothersome to her at this point.    Depression is better with Effexor.  States it will never be great but she is \"even Keel\" with it.      No suicidal ideation.        Objective     /78   Temp 36.2 °C (97.2 °F) (Skin)   Wt 64 kg (141 lb)   BMI 22.08 kg/m²      Physical Exam  Constitutional:       Appearance: Normal appearance.   Neck:      Comments: No goiter or thyroid mass.  Cardiovascular:      Rate and Rhythm: Normal rate and regular rhythm.      Heart sounds: Normal heart sounds. No murmur heard.  Pulmonary:      Effort: Pulmonary effort is normal. No respiratory distress.      Breath sounds: Normal breath sounds.   Neurological:      General: No focal deficit present.      Mental Status: She is alert and oriented to person, place, and time.   Psychiatric:      Comments: No SI       She complains of decreased hearing.      Assessment/Plan   Problem List Items Addressed This Visit    None  Visit Diagnoses         Attention deficit disorder, unspecified type        Relevant Medications    lisdexamfetamine (Vyvanse) 30 mg capsule        I will refill your Vyvanse at the same dose 30 mg daily.  Return in three months for a recheck.     Se Law, DO   "

## 2025-08-25 ENCOUNTER — APPOINTMENT (OUTPATIENT)
Dept: NEUROLOGY | Facility: CLINIC | Age: 45
End: 2025-08-25
Payer: COMMERCIAL

## 2025-08-25 VITALS
HEIGHT: 67 IN | WEIGHT: 142 LBS | TEMPERATURE: 96.4 F | SYSTOLIC BLOOD PRESSURE: 100 MMHG | BODY MASS INDEX: 22.29 KG/M2 | HEART RATE: 77 BPM | DIASTOLIC BLOOD PRESSURE: 64 MMHG

## 2025-08-25 DIAGNOSIS — R53.83 OTHER FATIGUE: ICD-10-CM

## 2025-08-25 DIAGNOSIS — R51.9 NONINTRACTABLE HEADACHE, UNSPECIFIED CHRONICITY PATTERN, UNSPECIFIED HEADACHE TYPE: ICD-10-CM

## 2025-08-25 DIAGNOSIS — G47.19 EXCESSIVE DAYTIME SLEEPINESS: ICD-10-CM

## 2025-08-25 DIAGNOSIS — G47.11 IDIOPATHIC HYPERSOMNIA: Primary | ICD-10-CM

## 2025-08-25 DIAGNOSIS — G43.109 MIGRAINE WITH AURA AND WITHOUT STATUS MIGRAINOSUS, NOT INTRACTABLE: ICD-10-CM

## 2025-08-25 PROCEDURE — 1036F TOBACCO NON-USER: CPT | Performed by: PSYCHIATRY & NEUROLOGY

## 2025-08-25 PROCEDURE — 99215 OFFICE O/P EST HI 40 MIN: CPT | Performed by: PSYCHIATRY & NEUROLOGY

## 2025-08-25 PROCEDURE — G8433 SCR FOR DEP NOT CPT DOC RSN: HCPCS | Performed by: PSYCHIATRY & NEUROLOGY

## 2025-08-25 PROCEDURE — 3008F BODY MASS INDEX DOCD: CPT | Performed by: PSYCHIATRY & NEUROLOGY

## 2025-08-25 ASSESSMENT — PAIN SCALES - GENERAL: PAINLEVEL_OUTOF10: 0-NO PAIN

## 2025-08-25 ASSESSMENT — ENCOUNTER SYMPTOMS: OCCASIONAL FEELINGS OF UNSTEADINESS: 0

## 2025-08-26 ENCOUNTER — TELEPHONE (OUTPATIENT)
Dept: NEUROLOGY | Facility: CLINIC | Age: 45
End: 2025-08-26
Payer: COMMERCIAL

## 2025-09-05 ENCOUNTER — CLINICAL SUPPORT (OUTPATIENT)
Dept: AUDIOLOGY | Facility: CLINIC | Age: 45
End: 2025-09-05
Payer: COMMERCIAL

## 2025-09-05 DIAGNOSIS — G47.19 EXCESSIVE DAYTIME SLEEPINESS: Primary | ICD-10-CM

## 2025-09-05 DIAGNOSIS — H91.93 DECREASED HEARING OF BOTH EARS: ICD-10-CM

## 2025-09-05 DIAGNOSIS — H93.293 ABNORMAL AUDITORY PERCEPTION OF BOTH EARS: Primary | ICD-10-CM

## 2025-09-05 PROCEDURE — 92557 COMPREHENSIVE HEARING TEST: CPT | Performed by: AUDIOLOGIST

## 2025-09-05 PROCEDURE — 92550 TYMPANOMETRY & REFLEX THRESH: CPT | Mod: 52 | Performed by: AUDIOLOGIST

## 2025-09-05 RX ORDER — METHYLPHENIDATE HYDROCHLORIDE 10 MG/1
10 TABLET ORAL
Qty: 60 TABLET | Refills: 0 | Status: SHIPPED | OUTPATIENT
Start: 2025-09-05 | End: 2025-10-05

## 2025-09-05 ASSESSMENT — PAIN SCALES - GENERAL: PAINLEVEL_OUTOF10: 0 - NO PAIN

## 2025-09-05 ASSESSMENT — PAIN - FUNCTIONAL ASSESSMENT: PAIN_FUNCTIONAL_ASSESSMENT: 0-10

## 2025-09-05 ASSESSMENT — ENCOUNTER SYMPTOMS: OCCASIONAL FEELINGS OF UNSTEADINESS: 0

## 2025-09-25 ENCOUNTER — APPOINTMENT (OUTPATIENT)
Dept: PRIMARY CARE | Facility: CLINIC | Age: 45
End: 2025-09-25
Payer: COMMERCIAL

## 2025-12-31 ENCOUNTER — APPOINTMENT (OUTPATIENT)
Dept: RHEUMATOLOGY | Facility: CLINIC | Age: 45
End: 2025-12-31
Payer: COMMERCIAL

## 2026-01-08 ENCOUNTER — APPOINTMENT (OUTPATIENT)
Dept: ENDOCRINOLOGY | Facility: CLINIC | Age: 46
End: 2026-01-08
Payer: COMMERCIAL

## 2026-04-03 ENCOUNTER — APPOINTMENT (OUTPATIENT)
Dept: ENDOCRINOLOGY | Facility: CLINIC | Age: 46
End: 2026-04-03
Payer: COMMERCIAL